# Patient Record
Sex: FEMALE | Race: WHITE | NOT HISPANIC OR LATINO | Employment: FULL TIME | URBAN - METROPOLITAN AREA
[De-identification: names, ages, dates, MRNs, and addresses within clinical notes are randomized per-mention and may not be internally consistent; named-entity substitution may affect disease eponyms.]

---

## 2017-01-13 ENCOUNTER — CONVERSION ENCOUNTER (OUTPATIENT)
Dept: FAMILY MEDICINE CLINIC | Facility: CLINIC | Age: 52
End: 2017-01-13

## 2017-01-13 LAB
ALBUMIN SERPL-MCNC: 4.6 G/DL (ref 3.6–5.1)
ALBUMIN/GLOB SERPL: NORMAL {RATIO} (ref 1–2.5)
ALP SERPL-CCNC: 47 UNITS/L (ref 33–130)
ALT SERPL-CCNC: 13 UNITS/L (ref 6–29)
AST SERPL-CCNC: 13 UNITS/L (ref 10–35)
BILIRUB SERPL-MCNC: 0.6 MG/DL (ref 0.2–1.2)
BUN SERPL-MCNC: 17 MG/DL (ref 7–25)
BUN/CREAT SERPL: NORMAL (ref 6–22)
CALCIUM SERPL-MCNC: 9.9 MG/DL (ref 8.6–10.4)
CHLORIDE SERPL-SCNC: 101 MMOL/L (ref 98–110)
CHOLEST SERPL-MCNC: 197 MG/DL (ref 125–200)
CHOLEST/HDLC SERPL: NORMAL {RATIO}
CO2 CONTENT VENOUS: 27 MMOL/L (ref 20–31)
CONV TOTAL PROTEIN: 7.9 G/DL (ref 6.1–8.1)
CREAT UR-MCNC: 1.02 MG/DL (ref 0.5–1.05)
GLOBULIN UR ELPH-MCNC: NORMAL G/DL (ref 1.9–3.7)
GLUCOSE SERPL-MCNC: 97 MG/DL (ref 65–99)
HDLC SERPL-MCNC: 61 MG/DL
LDLC SERPL CALC-MCNC: NORMAL MG/DL
POTASSIUM SERPL-SCNC: 4.1 MMOL/L (ref 3.5–5.3)
SODIUM SERPL-SCNC: 140 MMOL/L (ref 135–146)
TRIGL SERPL-MCNC: 68 MG/DL
TSH SERPL-ACNC: 2.04 MICROINTL UNITS/ML

## 2017-01-18 ENCOUNTER — APPOINTMENT (OUTPATIENT)
Dept: WOMENS IMAGING | Facility: HOSPITAL | Age: 52
End: 2017-01-18

## 2017-01-18 PROCEDURE — 19081 BX BREAST 1ST LESION STRTCTC: CPT | Performed by: RADIOLOGY

## 2017-02-02 ENCOUNTER — OFFICE VISIT (OUTPATIENT)
Dept: MAMMOGRAPHY | Facility: CLINIC | Age: 52
End: 2017-02-02

## 2017-02-02 VITALS
OXYGEN SATURATION: 97 % | TEMPERATURE: 97.5 F | DIASTOLIC BLOOD PRESSURE: 75 MMHG | BODY MASS INDEX: 36.48 KG/M2 | HEART RATE: 97 BPM | HEIGHT: 66 IN | SYSTOLIC BLOOD PRESSURE: 125 MMHG | WEIGHT: 227 LBS

## 2017-02-02 DIAGNOSIS — D05.11 DUCTAL CARCINOMA IN SITU (DCIS) OF RIGHT BREAST: Primary | ICD-10-CM

## 2017-02-02 PROCEDURE — 99205 OFFICE O/P NEW HI 60 MIN: CPT | Performed by: SURGERY

## 2017-02-02 RX ORDER — CALCIUM CARBONATE 200(500)MG
1 TABLET,CHEWABLE ORAL DAILY
Status: ON HOLD | COMMUNITY
End: 2017-03-01

## 2017-02-02 RX ORDER — DIAZEPAM 2 MG/1
10 TABLET ORAL ONCE
Status: CANCELLED | OUTPATIENT
Start: 2017-02-02 | End: 2017-02-02

## 2017-02-02 RX ORDER — ASPIRIN 81 MG/1
81 TABLET ORAL DAILY
COMMUNITY

## 2017-02-02 RX ORDER — CEFAZOLIN SODIUM 2 G/100ML
2 INJECTION, SOLUTION INTRAVENOUS ONCE
Status: CANCELLED | OUTPATIENT
Start: 2017-02-02 | End: 2017-02-02

## 2017-02-02 RX ORDER — CHLORAL HYDRATE 500 MG
1200 CAPSULE ORAL 2 TIMES DAILY
COMMUNITY
End: 2021-02-16

## 2017-02-02 NOTE — PROGRESS NOTES
Chief Complaint: Connie Green is a 51 y.o.. female here today for Breast Cancer (Right Breast)        History of Present Illness:  Patient presents with newly diagnosed DCIS.  6 months ago she had mammograms which described some microcalcifications in the right breast and a 6 month follow-up was recommended.  These calcifications increased in number on repeat studies and a biopsy was recommended.  The pathology report revealed DCIS which was low to intermediate grade.  It was also ER positive and [place] positive at 95%.  The Ki-67 was low at 8%.  The patient has not had any prior breast biopsies and denies any nipple discharge or trauma to the breast.  I have personally reviewed her imaging studies and agree with the findings.      Review of Systems:  Review of Systems   All other systems reviewed and are negative.       Past Medical and Surgical History:  Breast Biopsy History:  Patient has had the following breast biopsies:Jan 2017 right breast- malignant  Breast Cancer HIstory:  Patient does not have a past medical history of breast cancer.  Breast Operations, and year:  None    History   Smoking Status   • Never Smoker   Smokeless Tobacco   • Not on file     Obstetric History:  Patient is premenopausal, first day of last period: 12/25/2016   Number of pregnancies:4  Number of live births: 3  Number of abortions or miscarriages: 1 stillborn full term baby girl  Age of delivery of first child: 17  Patient breast fed, for the following lenth of time:18 months  Length of time taking birth control pills:15 years  Patient has never taken hormone replacement    Past Surgical History   Procedure Laterality Date   • Laparoscopic tubal ligation     • Endometrial ablation  2007       Past Medical History   Diagnosis Date   • Prothrombin gene mutation        Prior Hospitalizations, other than for surgery or childbirth, and year:  none    Social History:  Patient is .  Patient has 1 daughters. and Patient has 2  sons.    Family History:  Family History   Problem Relation Age of Onset   • Clotting disorder Father    • Heart failure Father    • Heart attack Father    • Alcohol abuse Brother    • Prostate cancer Brother 58   • Heart attack Paternal Grandmother    • Heart failure Paternal Grandmother        Vital Signs:  Vitals:    02/02/17 1049   BP: 125/75   Pulse: 97   Temp: 97.5 °F (36.4 °C)   SpO2: 97%       Medications:    Current Outpatient Prescriptions:   •  Prenatal Vit-Min-FA-Fish Oil ([employer] PRENATAL GUMMY PO), Take 2 tablets by mouth daily., Disp: , Rfl:      Physical Examination:  General Appearance:   Patient is in no distress.  She is well kept and has an obese build.   Psychiatric:  Patient with appropriate mood and affect. Alert and oriented to self, time, and place.    Breast, RIGHT:  medium sized, symmetric with the contralateral side.  Breast skin is without erythema, edema, rashes.  There are no visible abnormalities upon inspection during the arm-raising maneuver or with hands on hips in the sitting position. There is no nipple retraction, discharge or nipple/areolar skin changes.There is a small 1 cm lump in the 1:30 position of the right breast in the area of the biopsy.  There is no bruising detected.    Breast, LEFT:  medium sized, symmetric with the contralateral side.  Breast skin is without erythema, edema, rashes.  There are no visible abnormalities upon inspection during the arm-raising maneuver or with hands on hips in the sitting position. There is no nipple retraction, discharge or nipple/areolar skin changes.There are no masses palpable in the sitting or supine positions.    Lymphatic:  There is no axillary, cervical, infraclavicular, or supraclavicular adenopathy bilaterally.  Eyes:  Pupils are round and reactive to light.  Cardiovascular:  Heart rate and rhythm are regular.  Respiratory:  Lungs are clear bilaterally with no crackles or wheezes in any lung field.  Gastrointestinal:  Abdomen is  soft, nondistended, and nontender.  There is no evidence of hepatosplenomegaly.  There are  scars from previous tubal ligation.    Musculoskeletal:  Good strength in all 4 extremities.   There is good range of motion in both shoulders.    Skin:  No new skin lesions or rashes on the skin excluding the breast (see breast exam above).    Assessment:  Diagnoses and all orders for this visit:    Ductal carcinoma in situ (DCIS) of right breast    Other orders  -     aspirin 81 MG EC tablet; Take 81 mg by mouth Daily.  -     Omega-3 Fatty Acids (FISH OIL) 1000 MG capsule capsule; Take  by mouth Daily With Breakfast.  -     calcium carbonate (TUMS) 500 MG chewable tablet; Chew 1 tablet Daily.  -     Multiple Vitamins-Minerals (MULTI COMPLETE PO); Take  by mouth.  -     Melatonin-Pyridoxine (MELATIN PO); Take  by mouth.      Plan:  She was accompanied today by her .  The office visit lasted 45 minutes.    We began the conversation discussing her pathology report.  We talked about the origin of most of breast cancers from either the ducts or the lobules.  The difference between invasive and in situ disease was explained and the visual was drawn for her.  When invasion has occurred, the lymph nodes need to be evaluated.  When there is in situ disease the lymph nodes should be considered if the area of concern is widespread or it is high-grade.  We also discussed the significance of hormone receptors.  They often can give us some idea how the breast cancer will behave and potentially lead us to offer neoadjuvant chemotherapy.    Next we discussed the surgical options which include breast conserving therapy versus a mastectomy.  With breast conserving therapy we are talking about a lumpectomy with margins, lymph node evaluation, and radiation treatment.  The radiation treatment is generally given to the entire breast for 6 weeks.  The side effects consist of local skin change and potential injury to nearby structures such  as the lung or the heart.  A mastectomy would involve removing the breast tissues with preservation of the pectoral muscles and evaluation of the lymph nodes if indicated.  The potential for reconstruction by either an implant or autologous tissue was discussed.  This could be performed on a delayed basis or immediately depending on a multitude of factors.  The survival rates for these 2 procedures are equivalent but there are incidences where one may be favored over the other.  There are times when a lumpectomy is not possible due to the large tumor to breast ratio or the location of the tumor.  Previous radiation to the chest wall or collagen disorders such as scleroderma would make radiation treatment and possible and therefore exclude breast conserving therapy as an option.    The patient prefers breast conserving surgery and I think that is an excellent option for her.  I do not think she would benefit from an MRI or sentinel lymph node biopsy and I have explained that to her.  She does understand the needle localization process and her recovery time has been explained to her.  She understands the risks which include but are not limited to infection, bleeding, anesthesia, and the need to return to surgery for a positive margin.  She wishes to proceed.      CPT coding:    Next Appointment:  No Follow-up on file.

## 2017-02-08 ENCOUNTER — TELEPHONE (OUTPATIENT)
Dept: MAMMOGRAPHY | Facility: CLINIC | Age: 52
End: 2017-02-08

## 2017-02-08 NOTE — TELEPHONE ENCOUNTER
Left voicemail for patient on surgery details    PAT appointment is 2/21/17 @ 1230pm  Arrival time for surgery is  6 am 3/1/17 Main OR

## 2017-02-21 ENCOUNTER — HOSPITAL ENCOUNTER (OUTPATIENT)
Dept: GENERAL RADIOLOGY | Facility: HOSPITAL | Age: 52
Discharge: HOME | End: 2017-02-21
Admitting: SURGERY

## 2017-02-21 ENCOUNTER — APPOINTMENT (OUTPATIENT)
Dept: PREADMISSION TESTING | Facility: HOSPITAL | Age: 52
End: 2017-02-21

## 2017-02-21 VITALS
DIASTOLIC BLOOD PRESSURE: 86 MMHG | BODY MASS INDEX: 36.8 KG/M2 | SYSTOLIC BLOOD PRESSURE: 124 MMHG | HEIGHT: 66 IN | RESPIRATION RATE: 16 BRPM | WEIGHT: 229 LBS | TEMPERATURE: 97.9 F | HEART RATE: 83 BPM | OXYGEN SATURATION: 100 %

## 2017-02-21 DIAGNOSIS — D05.11 DUCTAL CARCINOMA IN SITU (DCIS) OF RIGHT BREAST: ICD-10-CM

## 2017-02-21 LAB
ALBUMIN SERPL-MCNC: 4.3 G/DL (ref 3.5–5.2)
ALBUMIN/GLOB SERPL: 1.3 G/DL
ALP SERPL-CCNC: 43 U/L (ref 39–117)
ALT SERPL W P-5'-P-CCNC: 15 U/L (ref 1–33)
ANION GAP SERPL CALCULATED.3IONS-SCNC: 19.1 MMOL/L
AST SERPL-CCNC: 18 U/L (ref 1–32)
BILIRUB SERPL-MCNC: 0.5 MG/DL (ref 0.1–1.2)
BUN BLD-MCNC: 16 MG/DL (ref 6–20)
BUN/CREAT SERPL: 17.8 (ref 7–25)
CALCIUM SPEC-SCNC: 9.2 MG/DL (ref 8.6–10.5)
CHLORIDE SERPL-SCNC: 102 MMOL/L (ref 98–107)
CO2 SERPL-SCNC: 19.9 MMOL/L (ref 22–29)
CREAT BLD-MCNC: 0.9 MG/DL (ref 0.57–1)
DEPRECATED RDW RBC AUTO: 44.2 FL (ref 37–54)
ERYTHROCYTE [DISTWIDTH] IN BLOOD BY AUTOMATED COUNT: 13.5 % (ref 11.7–13)
GFR SERPL CREATININE-BSD FRML MDRD: 66 ML/MIN/1.73
GLOBULIN UR ELPH-MCNC: 3.4 GM/DL
GLUCOSE BLD-MCNC: 97 MG/DL (ref 65–99)
HCT VFR BLD AUTO: 42.5 % (ref 35.6–45.5)
HGB BLD-MCNC: 14.3 G/DL (ref 11.9–15.5)
MCH RBC QN AUTO: 30 PG (ref 26.9–32)
MCHC RBC AUTO-ENTMCNC: 33.6 G/DL (ref 32.4–36.3)
MCV RBC AUTO: 89.3 FL (ref 80.5–98.2)
PLATELET # BLD AUTO: 256 10*3/MM3 (ref 140–500)
PMV BLD AUTO: 10.5 FL (ref 6–12)
POTASSIUM BLD-SCNC: 4 MMOL/L (ref 3.5–5.2)
PROT SERPL-MCNC: 7.7 G/DL (ref 6–8.5)
RBC # BLD AUTO: 4.76 10*6/MM3 (ref 3.9–5.2)
SODIUM BLD-SCNC: 141 MMOL/L (ref 136–145)
WBC NRBC COR # BLD: 6.68 10*3/MM3 (ref 4.5–10.7)

## 2017-02-21 PROCEDURE — 85027 COMPLETE CBC AUTOMATED: CPT | Performed by: SURGERY

## 2017-02-21 PROCEDURE — 36415 COLL VENOUS BLD VENIPUNCTURE: CPT

## 2017-02-21 PROCEDURE — 93010 ELECTROCARDIOGRAM REPORT: CPT | Performed by: INTERNAL MEDICINE

## 2017-02-21 PROCEDURE — 93005 ELECTROCARDIOGRAM TRACING: CPT

## 2017-02-21 PROCEDURE — 80053 COMPREHEN METABOLIC PANEL: CPT | Performed by: SURGERY

## 2017-02-21 PROCEDURE — 71020 HC CHEST PA AND LATERAL: CPT

## 2017-02-21 NOTE — DISCHARGE INSTRUCTIONS
Take the following medications the morning of surgery with a small sip of water.  NONE    ARRIVE AT 0600.        General Instructions:  • Do not eat or drink after midnight: includes water, mints, or gum. You may brush your teeth.  • Do not smoke, chew tobacco, or drink alcohol.  • Bring medications in original bottles, any inhalers and if applicable your C-PAP/ BI-PAP machine.  • Bring any papers given to you in the doctor’s office.  • Wear clean comfortable clothes and socks.  • Do not wear contact lenses or make-up.  Bring a case for your glasses if applicable.   • Bring crutches or walker if applicable.  • Leave all other valuables and jewelry at home.    If you were given a blood bank ID arm band remember to bring it with you the day of surgery.    Preventing a Surgical Site Infection:  Shower on the morning of surgery using a fresh bar of anti-bacterial soap (such as Dial) and clean washcloth.  Dry with a clean towel and dress in clean clothing.  For 2 to 3 days before surgery, avoid shaving with a razor near where you will have surgery because the razor can irritate skin and make it easier to develop an infection  Ask your surgeon if you will be receiving antibiotics prior to surgery  Make sure you, your family, and all healthcare providers clean their hands with soap and water or an alcohol based hand  before caring for you or your wound  If at all possible, quit smoking as many days before surgery as you can.    Day of surgery:  Upon arrival, a Pre-op nurse and Anesthesiologist will review your health history, obtain vital signs, and answer questions you may have.  The only belongings needed at this time will be your home medications and if applicable your C-PAP/BI-PAP machine.  If you are staying overnight your family can leave the rest of your belongings in the car and bring them to your room later.  A Pre-op nurse will start an IV and you may receive medication in preparation for surgery,  including something to help you relax.  Your family will be able to see you in the Pre-op area.  While you are in surgery your family should notify the waiting room  if they leave the waiting room area and provide a contact phone number.    Please be aware that surgery does come with discomfort.  We want to make every effort to control your discomfort so please discuss any uncontrolled symptoms with your nurse.   Your doctor will most likely have prescribed pain medications.      If you are going home after surgery you will receive individualized written care instructions before being discharged.  A responsible adult must drive you to and from the hospital on the day of your surgery and stay with you for 24 hours.    If you are staying overnight following surgery, you will be transported to your hospital room following the recovery period.  [facility] has all private rooms.    If you have any questions please call Pre-Admission Testing at [phone].  Deductibles and co-payments are collected on the day of service. Please be prepared to pay the required co-pay, deductible or deposit on the day of service as defined by your plan.

## 2017-03-01 ENCOUNTER — HOSPITAL ENCOUNTER (OUTPATIENT)
Dept: MAMMOGRAPHY | Facility: HOSPITAL | Age: 52
Discharge: HOME | End: 2017-03-01
Attending: SURGERY

## 2017-03-01 ENCOUNTER — APPOINTMENT (OUTPATIENT)
Dept: GENERAL RADIOLOGY | Facility: HOSPITAL | Age: 52
End: 2017-03-01

## 2017-03-01 ENCOUNTER — ANESTHESIA EVENT (OUTPATIENT)
Dept: PERIOP | Facility: HOSPITAL | Age: 52
End: 2017-03-01

## 2017-03-01 ENCOUNTER — ANESTHESIA (OUTPATIENT)
Dept: PERIOP | Facility: HOSPITAL | Age: 52
End: 2017-03-01

## 2017-03-01 ENCOUNTER — HOSPITAL ENCOUNTER (OUTPATIENT)
Facility: HOSPITAL | Age: 52
Setting detail: HOSPITAL OUTPATIENT SURGERY
Discharge: HOME | End: 2017-03-01
Attending: SURGERY | Admitting: SURGERY

## 2017-03-01 VITALS
WEIGHT: 227.13 LBS | BODY MASS INDEX: 36.5 KG/M2 | HEIGHT: 66 IN | SYSTOLIC BLOOD PRESSURE: 138 MMHG | TEMPERATURE: 97.8 F | DIASTOLIC BLOOD PRESSURE: 84 MMHG | HEART RATE: 67 BPM | OXYGEN SATURATION: 97 % | RESPIRATION RATE: 16 BRPM

## 2017-03-01 DIAGNOSIS — D05.11 DUCTAL CARCINOMA IN SITU (DCIS) OF RIGHT BREAST: ICD-10-CM

## 2017-03-01 LAB
B-HCG UR QL: NEGATIVE
INTERNAL NEGATIVE CONTROL: NEGATIVE
INTERNAL POSITIVE CONTROL: POSITIVE
Lab: NORMAL

## 2017-03-01 PROCEDURE — 25010000002 ONDANSETRON PER 1 MG: Performed by: NURSE ANESTHETIST, CERTIFIED REGISTERED

## 2017-03-01 PROCEDURE — 25010000002 MIDAZOLAM PER 1 MG: Performed by: ANESTHESIOLOGY

## 2017-03-01 PROCEDURE — 25010000002 MIDAZOLAM PER 1 MG: Performed by: NURSE ANESTHETIST, CERTIFIED REGISTERED

## 2017-03-01 PROCEDURE — 25010000003 CEFAZOLIN IN DEXTROSE 2-4 GM/100ML-% SOLUTION: Performed by: SURGERY

## 2017-03-01 PROCEDURE — 88307 TISSUE EXAM BY PATHOLOGIST: CPT | Performed by: SURGERY

## 2017-03-01 PROCEDURE — 19301 PARTIAL MASTECTOMY: CPT | Performed by: SURGERY

## 2017-03-01 PROCEDURE — 25010000002 DEXAMETHASONE PER 1 MG: Performed by: NURSE ANESTHETIST, CERTIFIED REGISTERED

## 2017-03-01 PROCEDURE — 25010000002 PROPOFOL 10 MG/ML EMULSION: Performed by: NURSE ANESTHETIST, CERTIFIED REGISTERED

## 2017-03-01 PROCEDURE — 25010000002 FENTANYL CITRATE (PF) 100 MCG/2ML SOLUTION: Performed by: NURSE ANESTHETIST, CERTIFIED REGISTERED

## 2017-03-01 RX ORDER — BUPIVACAINE HYDROCHLORIDE 2.5 MG/ML
INJECTION, SOLUTION INFILTRATION; PERINEURAL AS NEEDED
Status: DISCONTINUED | OUTPATIENT
Start: 2017-03-01 | End: 2017-03-01 | Stop reason: HOSPADM

## 2017-03-01 RX ORDER — MIDAZOLAM HYDROCHLORIDE 1 MG/ML
2 INJECTION INTRAMUSCULAR; INTRAVENOUS
Status: DISCONTINUED | OUTPATIENT
Start: 2017-03-01 | End: 2017-03-01 | Stop reason: HOSPADM

## 2017-03-01 RX ORDER — HYDROMORPHONE HYDROCHLORIDE 1 MG/ML
0.5 INJECTION, SOLUTION INTRAMUSCULAR; INTRAVENOUS; SUBCUTANEOUS
Status: DISCONTINUED | OUTPATIENT
Start: 2017-03-01 | End: 2017-03-01 | Stop reason: HOSPADM

## 2017-03-01 RX ORDER — PROMETHAZINE HYDROCHLORIDE 25 MG/ML
12.5 INJECTION, SOLUTION INTRAMUSCULAR; INTRAVENOUS ONCE AS NEEDED
Status: DISCONTINUED | OUTPATIENT
Start: 2017-03-01 | End: 2017-03-01 | Stop reason: HOSPADM

## 2017-03-01 RX ORDER — ONDANSETRON 2 MG/ML
INJECTION INTRAMUSCULAR; INTRAVENOUS AS NEEDED
Status: DISCONTINUED | OUTPATIENT
Start: 2017-03-01 | End: 2017-03-01 | Stop reason: SURG

## 2017-03-01 RX ORDER — PROMETHAZINE HYDROCHLORIDE 25 MG/1
25 SUPPOSITORY RECTAL ONCE AS NEEDED
Status: DISCONTINUED | OUTPATIENT
Start: 2017-03-01 | End: 2017-03-01 | Stop reason: HOSPADM

## 2017-03-01 RX ORDER — PROMETHAZINE HYDROCHLORIDE 25 MG/1
12.5 TABLET ORAL ONCE AS NEEDED
Status: DISCONTINUED | OUTPATIENT
Start: 2017-03-01 | End: 2017-03-01 | Stop reason: HOSPADM

## 2017-03-01 RX ORDER — FAMOTIDINE 10 MG/ML
20 INJECTION, SOLUTION INTRAVENOUS ONCE
Status: COMPLETED | OUTPATIENT
Start: 2017-03-01 | End: 2017-03-01

## 2017-03-01 RX ORDER — CEFAZOLIN SODIUM 2 G/100ML
2 INJECTION, SOLUTION INTRAVENOUS ONCE
Status: COMPLETED | OUTPATIENT
Start: 2017-03-01 | End: 2017-03-01

## 2017-03-01 RX ORDER — FLUMAZENIL 0.1 MG/ML
0.2 INJECTION INTRAVENOUS AS NEEDED
Status: DISCONTINUED | OUTPATIENT
Start: 2017-03-01 | End: 2017-03-01 | Stop reason: HOSPADM

## 2017-03-01 RX ORDER — LIDOCAINE WITH 8.4% SOD BICARB 0.9%(10ML)
3 SYRINGE (ML) INJECTION ONCE
Status: COMPLETED | OUTPATIENT
Start: 2017-03-01 | End: 2017-03-01

## 2017-03-01 RX ORDER — FENTANYL CITRATE 50 UG/ML
50 INJECTION, SOLUTION INTRAMUSCULAR; INTRAVENOUS
Status: DISCONTINUED | OUTPATIENT
Start: 2017-03-01 | End: 2017-03-01 | Stop reason: HOSPADM

## 2017-03-01 RX ORDER — NALOXONE HCL 0.4 MG/ML
0.2 VIAL (ML) INJECTION AS NEEDED
Status: DISCONTINUED | OUTPATIENT
Start: 2017-03-01 | End: 2017-03-01 | Stop reason: HOSPADM

## 2017-03-01 RX ORDER — DIAZEPAM 2 MG/1
10 TABLET ORAL ONCE
Status: COMPLETED | OUTPATIENT
Start: 2017-03-01 | End: 2017-03-01

## 2017-03-01 RX ORDER — MIDAZOLAM HYDROCHLORIDE 1 MG/ML
1 INJECTION INTRAMUSCULAR; INTRAVENOUS
Status: DISCONTINUED | OUTPATIENT
Start: 2017-03-01 | End: 2017-03-01 | Stop reason: HOSPADM

## 2017-03-01 RX ORDER — MIDAZOLAM HYDROCHLORIDE 1 MG/ML
INJECTION INTRAMUSCULAR; INTRAVENOUS AS NEEDED
Status: DISCONTINUED | OUTPATIENT
Start: 2017-03-01 | End: 2017-03-01 | Stop reason: SURG

## 2017-03-01 RX ORDER — PROPOFOL 10 MG/ML
VIAL (ML) INTRAVENOUS AS NEEDED
Status: DISCONTINUED | OUTPATIENT
Start: 2017-03-01 | End: 2017-03-01 | Stop reason: SURG

## 2017-03-01 RX ORDER — HYDROCODONE BITARTRATE AND ACETAMINOPHEN 7.5; 325 MG/1; MG/1
1 TABLET ORAL ONCE AS NEEDED
Status: COMPLETED | OUTPATIENT
Start: 2017-03-01 | End: 2017-03-01

## 2017-03-01 RX ORDER — ONDANSETRON 2 MG/ML
4 INJECTION INTRAMUSCULAR; INTRAVENOUS ONCE AS NEEDED
Status: DISCONTINUED | OUTPATIENT
Start: 2017-03-01 | End: 2017-03-01 | Stop reason: HOSPADM

## 2017-03-01 RX ORDER — DEXAMETHASONE SODIUM PHOSPHATE 10 MG/ML
INJECTION INTRAMUSCULAR; INTRAVENOUS AS NEEDED
Status: DISCONTINUED | OUTPATIENT
Start: 2017-03-01 | End: 2017-03-01 | Stop reason: SURG

## 2017-03-01 RX ORDER — FENTANYL CITRATE 50 UG/ML
INJECTION, SOLUTION INTRAMUSCULAR; INTRAVENOUS AS NEEDED
Status: DISCONTINUED | OUTPATIENT
Start: 2017-03-01 | End: 2017-03-01 | Stop reason: SURG

## 2017-03-01 RX ORDER — OXYCODONE AND ACETAMINOPHEN 7.5; 325 MG/1; MG/1
1 TABLET ORAL ONCE AS NEEDED
Status: DISCONTINUED | OUTPATIENT
Start: 2017-03-01 | End: 2017-03-01 | Stop reason: HOSPADM

## 2017-03-01 RX ORDER — SODIUM CHLORIDE, SODIUM LACTATE, POTASSIUM CHLORIDE, CALCIUM CHLORIDE 600; 310; 30; 20 MG/100ML; MG/100ML; MG/100ML; MG/100ML
9 INJECTION, SOLUTION INTRAVENOUS CONTINUOUS
Status: DISCONTINUED | OUTPATIENT
Start: 2017-03-01 | End: 2017-03-01 | Stop reason: HOSPADM

## 2017-03-01 RX ORDER — DIPHENHYDRAMINE HYDROCHLORIDE 50 MG/ML
12.5 INJECTION INTRAMUSCULAR; INTRAVENOUS
Status: DISCONTINUED | OUTPATIENT
Start: 2017-03-01 | End: 2017-03-01 | Stop reason: HOSPADM

## 2017-03-01 RX ORDER — HYDROMORPHONE HYDROCHLORIDE 1 MG/ML
0.25 INJECTION, SOLUTION INTRAMUSCULAR; INTRAVENOUS; SUBCUTANEOUS
Status: DISCONTINUED | OUTPATIENT
Start: 2017-03-01 | End: 2017-03-01 | Stop reason: HOSPADM

## 2017-03-01 RX ORDER — SODIUM CHLORIDE 0.9 % (FLUSH) 0.9 %
1-10 SYRINGE (ML) INJECTION AS NEEDED
Status: DISCONTINUED | OUTPATIENT
Start: 2017-03-01 | End: 2017-03-01 | Stop reason: HOSPADM

## 2017-03-01 RX ORDER — PROMETHAZINE HYDROCHLORIDE 25 MG/1
25 TABLET ORAL ONCE AS NEEDED
Status: DISCONTINUED | OUTPATIENT
Start: 2017-03-01 | End: 2017-03-01 | Stop reason: HOSPADM

## 2017-03-01 RX ORDER — LIDOCAINE HYDROCHLORIDE 20 MG/ML
INJECTION, SOLUTION INFILTRATION; PERINEURAL AS NEEDED
Status: DISCONTINUED | OUTPATIENT
Start: 2017-03-01 | End: 2017-03-01 | Stop reason: SURG

## 2017-03-01 RX ORDER — HYDRALAZINE HYDROCHLORIDE 20 MG/ML
5 INJECTION INTRAMUSCULAR; INTRAVENOUS
Status: DISCONTINUED | OUTPATIENT
Start: 2017-03-01 | End: 2017-03-01 | Stop reason: HOSPADM

## 2017-03-01 RX ORDER — LABETALOL HYDROCHLORIDE 5 MG/ML
5 INJECTION, SOLUTION INTRAVENOUS
Status: DISCONTINUED | OUTPATIENT
Start: 2017-03-01 | End: 2017-03-01 | Stop reason: HOSPADM

## 2017-03-01 RX ADMIN — FENTANYL CITRATE 25 MCG: 50 INJECTION INTRAMUSCULAR; INTRAVENOUS at 09:50

## 2017-03-01 RX ADMIN — FAMOTIDINE 20 MG: 10 INJECTION, SOLUTION INTRAVENOUS at 08:25

## 2017-03-01 RX ADMIN — FENTANYL CITRATE 50 MCG: 50 INJECTION INTRAMUSCULAR; INTRAVENOUS at 09:31

## 2017-03-01 RX ADMIN — MIDAZOLAM 2 MG: 1 INJECTION INTRAMUSCULAR; INTRAVENOUS at 08:25

## 2017-03-01 RX ADMIN — FENTANYL CITRATE 25 MCG: 50 INJECTION INTRAMUSCULAR; INTRAVENOUS at 09:44

## 2017-03-01 RX ADMIN — Medication 3 ML: at 07:53

## 2017-03-01 RX ADMIN — SODIUM CHLORIDE, POTASSIUM CHLORIDE, SODIUM LACTATE AND CALCIUM CHLORIDE 9 ML/HR: 600; 310; 30; 20 INJECTION, SOLUTION INTRAVENOUS at 08:25

## 2017-03-01 RX ADMIN — CEFAZOLIN SODIUM 2 G: 2 INJECTION, SOLUTION INTRAVENOUS at 09:26

## 2017-03-01 RX ADMIN — FENTANYL CITRATE 25 MCG: 50 INJECTION INTRAMUSCULAR; INTRAVENOUS at 10:01

## 2017-03-01 RX ADMIN — DIAZEPAM 10 MG: 5 TABLET ORAL at 07:12

## 2017-03-01 RX ADMIN — PROPOFOL 220 MG: 10 INJECTION, EMULSION INTRAVENOUS at 09:31

## 2017-03-01 RX ADMIN — MIDAZOLAM HYDROCHLORIDE 2 MG: 1 INJECTION, SOLUTION INTRAMUSCULAR; INTRAVENOUS at 09:27

## 2017-03-01 RX ADMIN — ONDANSETRON 4 MG: 2 INJECTION INTRAMUSCULAR; INTRAVENOUS at 09:45

## 2017-03-01 RX ADMIN — DEXAMETHASONE SODIUM PHOSPHATE 8 MG: 10 INJECTION INTRAMUSCULAR; INTRAVENOUS at 09:45

## 2017-03-01 RX ADMIN — LIDOCAINE HYDROCHLORIDE 50 MG: 20 INJECTION, SOLUTION INFILTRATION; PERINEURAL at 09:31

## 2017-03-01 RX ADMIN — SODIUM CHLORIDE, POTASSIUM CHLORIDE, SODIUM LACTATE AND CALCIUM CHLORIDE: 600; 310; 30; 20 INJECTION, SOLUTION INTRAVENOUS at 09:26

## 2017-03-01 RX ADMIN — HYDROCODONE BITARTRATE AND ACETAMINOPHEN 1 TABLET: 7.5; 325 TABLET ORAL at 11:08

## 2017-03-01 NOTE — PLAN OF CARE
Problem: Patient Care Overview (Adult)  Goal: Plan of Care Review  Outcome: Outcome(s) achieved Date Met:  03/01/17 03/01/17 1147   Coping/Psychosocial Response Interventions   Plan Of Care Reviewed With patient;spouse   Patient Care Overview   Progress improving   Outcome Evaluation   Outcome Summary/Follow up Plan ready for d/c home       Goal: Adult Individualization and Mutuality  Outcome: Outcome(s) achieved Date Met:  03/01/17  Goal: Discharge Needs Assessment  Outcome: Outcome(s) achieved Date Met:  03/01/17    Problem: Perioperative Period (Adult)  Goal: Signs and Symptoms of Listed Potential Problems Will be Absent or Manageable (Perioperative Period)  Outcome: Outcome(s) achieved Date Met:  03/01/17

## 2017-03-01 NOTE — ANESTHESIA PREPROCEDURE EVALUATION
Anesthesia Evaluation     Patient summary reviewed and Nursing notes reviewed   no history of anesthetic complications:  NPO Status: > 8 hours   Airway   Mallampati: II  TM distance: >3 FB  Neck ROM: full  no difficulty expected  Dental - normal exam     Pulmonary - negative pulmonary ROS and normal exam   Cardiovascular - negative cardio ROS and normal exam  Exercise tolerance: good (4-7 METS)    ECG reviewed  Rhythm: regular  Rate: normal        Neuro/Psych- negative ROS  GI/Hepatic/Renal/Endo    (+) obesity,      Musculoskeletal (-) negative ROS    Abdominal  - normal exam   Substance History - negative use     OB/GYN          Other - negative ROS                                   Anesthesia Plan    ASA 2     general     Anesthetic plan and risks discussed with patient.    Plan discussed with CRNA and attending.

## 2017-03-01 NOTE — ANESTHESIA PROCEDURE NOTES
Airway  Urgency: elective    Date/Time: 3/1/2017 9:31 AM  End Time:3/1/2017 9:33 AM  Airway not difficult    General Information and Staff    Patient location during procedure: OR  Anesthesiologist: NORM ISLAS  CRNA: GERRY DASILVA    Indications and Patient Condition  Indications for airway management: airway protection    Preoxygenated: yes  MILS maintained throughout  Mask difficulty assessment: 1 - vent by mask    Final Airway Details  Final airway type: supraglottic airway      Successful airway: classic  Size 4    Number of attempts at approach: 1    Additional Comments  PreO2 X 5 mins. SIVI. LMA inserted without difficulty. ETCO2 +, BBS=. Spontaneous respirations

## 2017-03-01 NOTE — H&P (VIEW-ONLY)
Chief Complaint: Connie Green is a 51 y.o.. female here today for Breast Cancer (Right Breast)        History of Present Illness:  Patient presents with newly diagnosed DCIS.  6 months ago she had mammograms which described some microcalcifications in the right breast and a 6 month follow-up was recommended.  These calcifications increased in number on repeat studies and a biopsy was recommended.  The pathology report revealed DCIS which was low to intermediate grade.  It was also ER positive and [place] positive at 95%.  The Ki-67 was low at 8%.  The patient has not had any prior breast biopsies and denies any nipple discharge or trauma to the breast.  I have personally reviewed her imaging studies and agree with the findings.      Review of Systems:  Review of Systems   All other systems reviewed and are negative.       Past Medical and Surgical History:  Breast Biopsy History:  Patient has had the following breast biopsies:Jan 2017 right breast- malignant  Breast Cancer HIstory:  Patient does not have a past medical history of breast cancer.  Breast Operations, and year:  None    History   Smoking Status   • Never Smoker   Smokeless Tobacco   • Not on file     Obstetric History:  Patient is premenopausal, first day of last period: 12/25/2016   Number of pregnancies:4  Number of live births: 3  Number of abortions or miscarriages: 1 stillborn full term baby girl  Age of delivery of first child: 17  Patient breast fed, for the following lenth of time:18 months  Length of time taking birth control pills:15 years  Patient has never taken hormone replacement    Past Surgical History   Procedure Laterality Date   • Laparoscopic tubal ligation     • Endometrial ablation  2007       Past Medical History   Diagnosis Date   • Prothrombin gene mutation        Prior Hospitalizations, other than for surgery or childbirth, and year:  none    Social History:  Patient is .  Patient has 1 daughters. and Patient has 2  sons.    Family History:  Family History   Problem Relation Age of Onset   • Clotting disorder Father    • Heart failure Father    • Heart attack Father    • Alcohol abuse Brother    • Prostate cancer Brother 58   • Heart attack Paternal Grandmother    • Heart failure Paternal Grandmother        Vital Signs:  Vitals:    02/02/17 1049   BP: 125/75   Pulse: 97   Temp: 97.5 °F (36.4 °C)   SpO2: 97%       Medications:    Current Outpatient Prescriptions:   •  Prenatal Vit-Min-FA-Fish Oil ([employer] PRENATAL GUMMY PO), Take 2 tablets by mouth daily., Disp: , Rfl:      Physical Examination:  General Appearance:   Patient is in no distress.  She is well kept and has an obese build.   Psychiatric:  Patient with appropriate mood and affect. Alert and oriented to self, time, and place.    Breast, RIGHT:  medium sized, symmetric with the contralateral side.  Breast skin is without erythema, edema, rashes.  There are no visible abnormalities upon inspection during the arm-raising maneuver or with hands on hips in the sitting position. There is no nipple retraction, discharge or nipple/areolar skin changes.There is a small 1 cm lump in the 1:30 position of the right breast in the area of the biopsy.  There is no bruising detected.    Breast, LEFT:  medium sized, symmetric with the contralateral side.  Breast skin is without erythema, edema, rashes.  There are no visible abnormalities upon inspection during the arm-raising maneuver or with hands on hips in the sitting position. There is no nipple retraction, discharge or nipple/areolar skin changes.There are no masses palpable in the sitting or supine positions.    Lymphatic:  There is no axillary, cervical, infraclavicular, or supraclavicular adenopathy bilaterally.  Eyes:  Pupils are round and reactive to light.  Cardiovascular:  Heart rate and rhythm are regular.  Respiratory:  Lungs are clear bilaterally with no crackles or wheezes in any lung field.  Gastrointestinal:  Abdomen is  soft, nondistended, and nontender.  There is no evidence of hepatosplenomegaly.  There are  scars from previous tubal ligation.    Musculoskeletal:  Good strength in all 4 extremities.   There is good range of motion in both shoulders.    Skin:  No new skin lesions or rashes on the skin excluding the breast (see breast exam above).    Assessment:  Diagnoses and all orders for this visit:    Ductal carcinoma in situ (DCIS) of right breast    Other orders  -     aspirin 81 MG EC tablet; Take 81 mg by mouth Daily.  -     Omega-3 Fatty Acids (FISH OIL) 1000 MG capsule capsule; Take  by mouth Daily With Breakfast.  -     calcium carbonate (TUMS) 500 MG chewable tablet; Chew 1 tablet Daily.  -     Multiple Vitamins-Minerals (MULTI COMPLETE PO); Take  by mouth.  -     Melatonin-Pyridoxine (MELATIN PO); Take  by mouth.      Plan:  She was accompanied today by her .  The office visit lasted 45 minutes.    We began the conversation discussing her pathology report.  We talked about the origin of most of breast cancers from either the ducts or the lobules.  The difference between invasive and in situ disease was explained and the visual was drawn for her.  When invasion has occurred, the lymph nodes need to be evaluated.  When there is in situ disease the lymph nodes should be considered if the area of concern is widespread or it is high-grade.  We also discussed the significance of hormone receptors.  They often can give us some idea how the breast cancer will behave and potentially lead us to offer neoadjuvant chemotherapy.    Next we discussed the surgical options which include breast conserving therapy versus a mastectomy.  With breast conserving therapy we are talking about a lumpectomy with margins, lymph node evaluation, and radiation treatment.  The radiation treatment is generally given to the entire breast for 6 weeks.  The side effects consist of local skin change and potential injury to nearby structures such  as the lung or the heart.  A mastectomy would involve removing the breast tissues with preservation of the pectoral muscles and evaluation of the lymph nodes if indicated.  The potential for reconstruction by either an implant or autologous tissue was discussed.  This could be performed on a delayed basis or immediately depending on a multitude of factors.  The survival rates for these 2 procedures are equivalent but there are incidences where one may be favored over the other.  There are times when a lumpectomy is not possible due to the large tumor to breast ratio or the location of the tumor.  Previous radiation to the chest wall or collagen disorders such as scleroderma would make radiation treatment and possible and therefore exclude breast conserving therapy as an option.    The patient prefers breast conserving surgery and I think that is an excellent option for her.  I do not think she would benefit from an MRI or sentinel lymph node biopsy and I have explained that to her.  She does understand the needle localization process and her recovery time has been explained to her.  She understands the risks which include but are not limited to infection, bleeding, anesthesia, and the need to return to surgery for a positive margin.  She wishes to proceed.      CPT coding:    Next Appointment:  No Follow-up on file.

## 2017-03-01 NOTE — OP NOTE
Needle Localization Breast lumpectomy for DCIS Procedure Note    Name: Connie Green  Age: 51 y.o.  Sex: female  :  1965  MRN: [id number]      Pre-operative Diagnosis:rightDCIS    Post-operative Diagnosis: Same    Procedure:right Needle Localization Breast lumpectomy    Surgeon: Neal Sanchez MD.,  FACS    Assistants: none    Anesthesia: General    Indications: This patient recently was diagnosed with right DCIS after presenting with an abnormal mammogram.  After discussing the options, the patient prefers to have breast conserving surgery.  She is here for a lumpectomy.      Procedure Details   The patient was seen in the Holding Room. The risks, benefits, complications, treatment options, and expected outcomes were discussed with the patient. The possibilities of reaction to medication, pulmonary aspiration, bleeding, infection, the need for additional procedures, failure to diagnose a condition, and creating a complication requiring transfusion or operation were discussed with the patient. The patient concurred with the proposed plan, giving informed consent.  The site of surgery properly noted/marked.    The patient underwent preoperative guidewire localization of a mammographic abnormality in the  upper inner aspect of the right breast.       The patient was then taken to Operating Room; identified patient as Connie Green  and the procedure verified as rightNeedle Localization  Breast lumpectomy  A Time Out was held and the above information confirmed.       The breast was prepped and draped in standard fashion. Marcaine  0.50% with epinephrine was used to anesthetize the skin at the site of the guidewire placement.   A curvilinear incision was created on the breast near the localization site.  Dissection was carried down to the localized area which was completely excised.  The dissection was carried down to the chest wall.  A specimen radiograph confirmed inclusion of the preoperatively identified  mammographic lesion/ clip.  Additional shave margins were obtained from areas thought to be close.  This included the anterior superior and lateral margins.. Hemostasis was achieved with cautery.  Closure was performed  with interrupted 3-0 Vicryl sutures and a 4-0 Vicryl subcuticular closure.      Steri-Strips were applied. At the end of the operation, all sponge, instrument, and needle counts were correct.    Findings:  There were no unexpected findings  Estimated Blood Loss:  Minimal           Drains: none          Specimens:   ID Type Source Tests Collected by Time Destination   A : RIGHT BREAST LUMPECTOMY-SINGLE SHORT STITCH MARKS SUPERIOR, LONG STITCH MARKS LATERAL, DOUBLE STITCH MARKS  ANTERIOR Tissue Breast, Right TISSUE EXAM Neal Sanchez MD 3/1/2017 1009    B : RIGHT BREAST LUMPECTOMY--STITCH MARKS NEW MEDIAL MARGIN Tissue Breast, Right TISSUE EXAM Neal Sanchez MD 3/1/2017 1014    C : RIGHT BREAST LUMPECTOMY--STITCH MARKS NEW SUPERIOR MARGIN Tissue Breast, Right TISSUE EXAM Neal Sanchez MD 3/1/2017 1020    D : RIGHT BREAST LUMPECTOMY--STITCH MARKS NEW ANTERIOR MARGIN Tissue Breast, Right TISSUE EXAM Neal Sanchez MD 3/1/2017 1021        Complications:  None; patient tolerated the procedure well.           Condition: stable

## 2017-03-01 NOTE — ANESTHESIA POSTPROCEDURE EVALUATION
Patient: Connie Green    Procedure Summary     Date Anesthesia Start Anesthesia Stop Room / Location    03/01/17 0926 1054  [facility]       Procedure Diagnosis Surgeon Provider    RIGHT BREAST LUMPECTOMY WITH NEEDLE LOCALIZATION (Right Breast) Ductal carcinoma in situ (DCIS) of right breast  (Ductal carcinoma in situ (DCIS) of right breast [D05.11]) MD Shar Fletcher MD          Anesthesia Type: general  Last vitals  /91 (03/01/17 1131)    Temp 36.6 °C (97.8 °F) (03/01/17 1120)    Pulse 71 (03/01/17 1131)   Resp 16 (03/01/17 1131)    SpO2 98 % (03/01/17 1131)      Post Anesthesia Care and Evaluation    Patient location during evaluation: PACU  Patient participation: complete - patient participated  Level of consciousness: awake and alert  Pain score: 0  Pain management: adequate  Airway patency: patent  Anesthetic complications: No anesthetic complications    Cardiovascular status: acceptable  Respiratory status: acceptable  Hydration status: acceptable

## 2017-03-02 ENCOUNTER — TELEPHONE (OUTPATIENT)
Dept: MAMMOGRAPHY | Facility: CLINIC | Age: 52
End: 2017-03-02

## 2017-03-02 DIAGNOSIS — D05.11 DUCTAL CARCINOMA IN SITU (DCIS) OF RIGHT BREAST: Primary | ICD-10-CM

## 2017-03-02 LAB
CYTO UR: NORMAL
LAB AP CASE REPORT: NORMAL
Lab: NORMAL
PATH REPORT.FINAL DX SPEC: NORMAL
PATH REPORT.GROSS SPEC: NORMAL

## 2017-03-02 NOTE — TELEPHONE ENCOUNTER
I told her the lumpectomy specimen showed no residual DCIS and we therefore have very widely clear margins.  I have placed orders for her to see both radiation and the medical oncologist

## 2017-03-15 ENCOUNTER — APPOINTMENT (OUTPATIENT)
Dept: ONCOLOGY | Facility: CLINIC | Age: 52
End: 2017-03-15

## 2017-03-15 ENCOUNTER — LAB (OUTPATIENT)
Dept: LAB | Facility: HOSPITAL | Age: 52
End: 2017-03-15

## 2017-03-15 ENCOUNTER — DOCUMENTATION (OUTPATIENT)
Dept: ONCOLOGY | Facility: CLINIC | Age: 52
End: 2017-03-15

## 2017-03-15 ENCOUNTER — CONSULT (OUTPATIENT)
Dept: ONCOLOGY | Facility: CLINIC | Age: 52
End: 2017-03-15

## 2017-03-15 VITALS
HEIGHT: 66 IN | WEIGHT: 231.8 LBS | BODY MASS INDEX: 37.25 KG/M2 | SYSTOLIC BLOOD PRESSURE: 122 MMHG | RESPIRATION RATE: 16 BRPM | DIASTOLIC BLOOD PRESSURE: 78 MMHG | TEMPERATURE: 98 F | HEART RATE: 88 BPM | OXYGEN SATURATION: 98 %

## 2017-03-15 DIAGNOSIS — D68.52 HETEROZYGOUS FOR PROTHROMBIN G20210A MUTATION (HCC): ICD-10-CM

## 2017-03-15 DIAGNOSIS — D05.11 DUCTAL CARCINOMA IN SITU OF RIGHT BREAST: Primary | ICD-10-CM

## 2017-03-15 DIAGNOSIS — D68.59 PROTEIN C DEFICIENCY (HCC): ICD-10-CM

## 2017-03-15 LAB
ANION GAP SERPL CALCULATED.3IONS-SCNC: 11.6 MMOL/L
BASOPHILS # BLD AUTO: 0.02 10*3/MM3 (ref 0–0.1)
BASOPHILS NFR BLD AUTO: 0.2 % (ref 0–1.1)
BUN BLD-MCNC: 19 MG/DL (ref 6–20)
BUN/CREAT SERPL: 21.1 (ref 7.3–30)
CALCIUM SPEC-SCNC: 9.2 MG/DL (ref 8.5–10.2)
CHLORIDE SERPL-SCNC: 99 MMOL/L (ref 98–107)
CO2 SERPL-SCNC: 26.4 MMOL/L (ref 22–29)
CREAT BLD-MCNC: 0.9 MG/DL (ref 0.6–1.1)
DEPRECATED RDW RBC AUTO: 42.5 FL (ref 37–49)
EOSINOPHIL # BLD AUTO: 0.09 10*3/MM3 (ref 0–0.36)
EOSINOPHIL NFR BLD AUTO: 0.9 % (ref 1–5)
ERYTHROCYTE [DISTWIDTH] IN BLOOD BY AUTOMATED COUNT: 13.1 % (ref 11.7–14.5)
GFR SERPL CREATININE-BSD FRML MDRD: 66 ML/MIN/1.73
GLUCOSE BLD-MCNC: 133 MG/DL (ref 74–124)
HCT VFR BLD AUTO: 43.3 % (ref 34–45)
HGB BLD-MCNC: 14.4 G/DL (ref 11.5–14.9)
HOLD SPECIMEN: NORMAL
IMM GRANULOCYTES # BLD: 0.03 10*3/MM3 (ref 0–0.03)
IMM GRANULOCYTES NFR BLD: 0.3 % (ref 0–0.5)
LYMPHOCYTES # BLD AUTO: 2.97 10*3/MM3 (ref 1–3.5)
LYMPHOCYTES NFR BLD AUTO: 30.7 % (ref 20–49)
MCH RBC QN AUTO: 29.3 PG (ref 27–33)
MCHC RBC AUTO-ENTMCNC: 33.3 G/DL (ref 32–35)
MCV RBC AUTO: 88 FL (ref 83–97)
MONOCYTES # BLD AUTO: 0.53 10*3/MM3 (ref 0.25–0.8)
MONOCYTES NFR BLD AUTO: 5.5 % (ref 4–12)
NEUTROPHILS # BLD AUTO: 6.05 10*3/MM3 (ref 1.5–7)
NEUTROPHILS NFR BLD AUTO: 62.4 % (ref 39–75)
NRBC BLD MANUAL-RTO: 0 /100 WBC (ref 0–0)
PLATELET # BLD AUTO: 271 10*3/MM3 (ref 150–375)
PMV BLD AUTO: 9.7 FL (ref 8.9–12.1)
POTASSIUM BLD-SCNC: 3.8 MMOL/L (ref 3.5–4.7)
RBC # BLD AUTO: 4.92 10*6/MM3 (ref 3.9–5)
SODIUM BLD-SCNC: 137 MMOL/L (ref 134–145)
WBC NRBC COR # BLD: 9.69 10*3/MM3 (ref 4–10)
WHOLE BLOOD HOLD SPECIMEN: NORMAL
WHOLE BLOOD HOLD SPECIMEN: NORMAL

## 2017-03-15 PROCEDURE — 36415 COLL VENOUS BLD VENIPUNCTURE: CPT | Performed by: INTERNAL MEDICINE

## 2017-03-15 PROCEDURE — 99245 OFF/OP CONSLTJ NEW/EST HI 55: CPT | Performed by: INTERNAL MEDICINE

## 2017-03-15 PROCEDURE — 80048 BASIC METABOLIC PNL TOTAL CA: CPT | Performed by: INTERNAL MEDICINE

## 2017-03-15 RX ORDER — CHLORHEXIDINE GLUCONATE 0.12 MG/ML
RINSE ORAL
Refills: 5 | COMMUNITY
Start: 2017-03-06 | End: 2017-08-31

## 2017-03-15 NOTE — PROGRESS NOTES
History:     Reason For Consultation:   1. Right upper inner quadrant ductal carcinoma in situ, ER+   * status post lumpectomy 3/1/2017    Referring Provider:   Neal Sanchez MD  [address] [id number]    Records Obtained: Records of the patients history including those obtained from the referring provider were reviewed and summarized in detail.    HPI:   Connie Green presents for consultation of newly diagnosed DCIS.  She presented for routine screening mammogram in 2016 that revealed calcifications in the right breast at the 3 o'clock position.  Diagnostic mammogram was recommended which showed suspicious calcifications in the right breast at the 1:30 o'clock region with stereotactic biopsy recommended.  The biopsy specimen from 2017 revealed intermediate grade, ductal carcinoma in situ, estrogen receptor 96%, progesterone receptor 96%, Ki-67 8%, measuring at least 0.3 cm.  On 3/1/2017 patient underwent a needle localized right breast lumpectomy with pathology showing fibroinflammatory changes consistent with site of prior biopsy.  No residual ductal carcinoma in situ was identified.  All margins were free of atypia.    Patient also has a history of thrombophilia.  She has been evaluated by Dr Vidal in [place] and was tested for thrombophilia and has a heterozygous prothrombin gene mutation as well as a persistently, mildly low protein C of 63%.  She has been recommended to take a baby aspirin and use prophylactic Lovenox on long flights. Personal history is significant for late pregnancy stillbirth.  Family history significant for a father who  at age 42 from complications of hypercoagulable state along with multiple vascular catastrophes.  He had his first thrombosis in his upper extremity age 20 and then developed thrombosis in right lower extremity at age 25.  He also had multiple arterial thromboses throughout his life.    She is feeling well without new  concerns. She's healed well from surgery and doesn't really have any pain at surgical site. She is pre-menopausal without any symptoms of menopause. Her last menstrual period was in February.     Past Medical   Past Medical History   Diagnosis Date   • Cancer      right breast cancer   • Prothrombin gene mutation      Patient Active Problem List   Diagnosis   • Ductal carcinoma in situ of right breast   • Protein C deficiency   • Heterozygous for prothrombin [id number] mutation     Social History   Social History     Social History   • Marital status:      Spouse name: Yelitza   • Number of children: 3   • Years of education: N/A     Occupational History   • Not on file.     Social History Main Topics   • Smoking status: Never Smoker   • Smokeless tobacco: Not on file   • Alcohol use No   • Drug use: No   • Sexual activity: Not on file      Comment:      Other Topics Concern   • Not on file     Social History Narrative     Family History  Family History   Problem Relation Age of Onset   • Clotting disorder Father    • Heart failure Father    • Heart attack Father    • Alcohol abuse Brother    • Prostate cancer Brother 58   • Heart attack Paternal Grandmother    • Heart failure Paternal Grandmother      Medications    Current Outpatient Prescriptions:   •  aspirin 81 MG EC tablet, Take 81 mg by mouth Daily. HOLD PER MD INSTR, Disp: , Rfl:   •  Calcium Carb-Cholecalciferol (CALCIUM + D3 PO), Take 1 tablet by mouth Daily., Disp: , Rfl:   •  chlorhexidine (PERIDEX) 0.12 % solution, RINSE 15ML AFTER BREAKFAST AND BEFORE BEDTIME, Disp: , Rfl: 5  •  Melatonin-Pyridoxine (MELATIN PO), Take 2 tablets by mouth Every Night. Unsure of dose, Disp: , Rfl:   •  Multiple Vitamins-Minerals (MULTI COMPLETE PO), Take  by mouth., Disp: , Rfl:   •  Omega-3 Fatty Acids (FISH OIL) 1000 MG capsule capsule, Take 1,000 mg by mouth Daily With Breakfast. HOLD PER MD INSTR, Disp: , Rfl:   Allergies  Allergies   Allergen Reactions   •  "Codeine Nausea And Vomiting     Review of Systems  GENERAL: No change in appetite or weight; No fevers, chills, sweats.    SKIN: No nonhealing lesions. No rashes.  HEME/LYMPH: No easy bruising, bleeding. No swollen nodes.   EYES: No vision changes or diplopia.   ENT: No tinnitus, hearing loss, gum bleeding, epistaxis, hoarseness or dysphagia.   RESPIRATORY: No cough, shortness of breath, hemoptysis or wheezing.   CVS: No chest pain, palpitations, orthopnea, dyspnea on exertion or PND.   GI: No melena or hematochezia. No abdominal pain. No nausea, vomiting, constipation, diarrhea  : No lower tract obstructive symptoms, dysuria or hematuria.   MUSCULOSKELETAL: No bone pain.No joint stiffness.   NEUROLOGICAL: No global weakness, loss of consciousness or seizures.   PSYCHIATRIC: No increased nervousness, mood changes or depression.      Objective     Objective:     Vitals:    03/15/17 1436   BP: 122/78   Pulse: 88   Resp: 16   Temp: 98 °F (36.7 °C)   SpO2: 98%   Weight: 231 lb 12.8 oz (105 kg)   Height: 66.14\" (168 cm)  Comment: new    PainSc: 0-No pain     Current Status 3/15/2017   ECOG score 0     GENERAL:  Well-developed, well-nourished female in no acute distress.   SKIN:  Warm, dry without rashes, purpura or petechiae.  HEAD:  Normocephalic.  EYES:  Pupils equal, round and reactive to light.  EOMs intact.  Conjunctivae normal.  EARS:  Hearing intact.  NOSE:  Septum midline.  No excoriations or nasal discharge.  MOUTH:  Tongue is without ulcers. Lips normal.  THROAT:  Oropharynx without lesions or exudates.  NECK:  Supple with good range of motion; no masses  LYMPHATICS:  No cervical, supraclavicular, axillary adenopathy.  CHEST:  Lungs clear to percussion and auscultation. Good airflow. Right breast with well healing incision in upper inner quadrant.  CARDIAC:  Regular rate and rhythm without murmurs, rubs or gallops. Normal S1,S2.  ABDOMEN:  Soft, nontender, no hepatosplenomegaly, normal bowel " sounds  EXTREMITIES:  No clubbing, cyanosis or edema.  PSYCHIATRIC:  Normal affect and mood.     Labs and Imaging  Results for orders placed or performed in visit on 03/15/17   CBC Auto Differential   Result Value Ref Range    WBC 9.69 4.00 - 10.00 10*3/mm3    RBC 4.92 3.90 - 5.00 10*6/mm3    Hemoglobin 14.4 11.5 - 14.9 g/dL    Hematocrit 43.3 34.0 - 45.0 %    MCV 88.0 83.0 - 97.0 fL    MCH 29.3 27.0 - 33.0 pg    MCHC 33.3 32.0 - 35.0 g/dL    RDW 13.1 11.7 - 14.5 %    RDW-SD 42.5 37.0 - 49.0 fl    MPV 9.7 8.9 - 12.1 fL    Platelets 271 150 - 375 10*3/mm3    Neutrophil % 62.4 39.0 - 75.0 %    Lymphocyte % 30.7 20.0 - 49.0 %    Monocyte % 5.5 4.0 - 12.0 %    Eosinophil % 0.9 (L) 1.0 - 5.0 %    Basophil % 0.2 0.0 - 1.1 %    Immature Grans % 0.3 0.0 - 0.5 %    Neutrophils, Absolute 6.05 1.50 - 7.00 10*3/mm3    Lymphocytes, Absolute 2.97 1.00 - 3.50 10*3/mm3    Monocytes, Absolute 0.53 0.25 - 0.80 10*3/mm3    Eosinophils, Absolute 0.09 0.00 - 0.36 10*3/mm3    Basophils, Absolute 0.02 0.00 - 0.10 10*3/mm3    Immature Grans, Absolute 0.03 0.00 - 0.03 10*3/mm3    nRBC 0.0 0.0 - 0.0 /100 WBC     Pathology and imaging as above.     Assessment/Plan   Assessment/Plan:   1. Right breast, upper inner quadrant, ductal carcinoma in situ, ER 96%, UT 96%   * status post lumpectomy 3/1/2017    * We discussed the prognosis for her non-invasive breast cancer, particularly in terms of risks of local recurrences. We reviewed treatment recommendations including radiation therapy and possible chemoprevention with endocrine therapy.  We discussed that Tamoxifen provides benefit by decreasing ipsilateral and contralateral breast invasive and noninvasive cancers based on multiple NSABP clinic trials in estrogen receptor positive DCIS. However no overall survival benefit has been found.   * However, with her history of thrombophilia with heterozygous prothrombin gene mutation and slightly low protein C along with family history of thrombosis, I  think her risk of clotting with Tamoxifen is greater than her benefit. I also do not think that AI with ovarian suppression is in her best interest since this is DCIS and not invasive. We discussed the possibility of adding Arimidex once she's post-menopausal for chemoprevention. I think she could also be a candidate for mammogram and MRI due to her increased lifetime risk of malignancy/DCIS. When I see her back in 6 months, we'll order imaging. I encouraged self breast exams as well.    * She has been referred to radiation oncology and will see Dr Burns on Friday.     2.  Heterozygous prothrombin gene mutation and mildly low protein C (63%) with family history but no personal history of thrombosis, although she did have fetal demise at almost term. She is on a baby aspirin and has been encouraged to avoid any supplemental estrogens. Will repeat protein C today.     Follow-up in 6 months. I asked the patient to call for any questions, concerns, or new symptoms.

## 2017-03-17 ENCOUNTER — OFFICE VISIT (OUTPATIENT)
Dept: RADIATION ONCOLOGY | Facility: HOSPITAL | Age: 52
End: 2017-03-17

## 2017-03-17 ENCOUNTER — APPOINTMENT (OUTPATIENT)
Dept: RADIATION ONCOLOGY | Facility: HOSPITAL | Age: 52
End: 2017-03-17

## 2017-03-17 VITALS
OXYGEN SATURATION: 98 % | HEART RATE: 84 BPM | SYSTOLIC BLOOD PRESSURE: 136 MMHG | BODY MASS INDEX: 37.12 KG/M2 | DIASTOLIC BLOOD PRESSURE: 85 MMHG | WEIGHT: 231 LBS

## 2017-03-17 DIAGNOSIS — D05.11 DUCTAL CARCINOMA IN SITU OF RIGHT BREAST: Primary | ICD-10-CM

## 2017-03-17 PROCEDURE — 99243 OFF/OP CNSLTJ NEW/EST LOW 30: CPT | Performed by: RADIOLOGY

## 2017-03-17 PROCEDURE — G0463 HOSPITAL OUTPT CLINIC VISIT: HCPCS | Performed by: RADIOLOGY

## 2017-03-17 NOTE — PROGRESS NOTES
DIAGNOSIS and REASON FOR CONSULTATION: Ductal carcinoma in situ of right breast    Referring Provider:  Neal Sanchez MD  Patient Care Team:  No Known Provider as PCP - General  Berenice Rosa MD as Consulting Physician (Obstetrics and Gynecology)  Kateryna De Leon MD as Consulting Physician (Hematology and Oncology)  Radha Burns MD as Consulting Physician (Radiation Oncology)  Adriane Colindres MD (Internal Medicine)  Neal Sanchez MD as Referring Physician (Breast Surgery)    CHIEF COMPLAINT:  Ductal carcinoma in situ of right breast  HISTORY OF PRESENT ILLNESS:  The patient is a 51 y.o. year old female who was found to have an abnormality on routine screening mammogram dated November 29, 2016.  This revealed calcifications in the right breast which were new and diagnostic mammogram images on December 20, 2016 confirmed pleomorphic calcifications in the 1:30 o'clock position of the right breast.    She underwent stereotactic guided biopsy and clip placement on January 18, 2017 which revealed a ductal carcinoma in situ, intermediate grade, measuring at least 3 mm in greatest dimension.  The tissue was found to be positive for both estrogen and progesterone receptors with a favorable Ki-67.    She was seen by Dr. Sanchez and opted for breast conservation and underwent a needle localized right sided lumpectomy on March 1, 2017 which revealed no residual ductal carcinoma and only fibroinflammatory changes consistent with previous biopsy and focal ductal hyperplasia.    She has done well postoperatively and was referred on to the [facility] physicians who discussed the possibility of preventive endocrine therapy; however she does have a history of thrombophilia and a family history thrombosis so tamoxifen was not recommended.  They discussed consideration of an aromatase inhibitor once post-menopausal and she returns in 6 months.  She presents today to discuss the possibility of postoperative  radiation therapy.    Clinically, she is doing well. She feels well and has continued to work. She has no complaints on review of systems.    Past Medical History: she  has a past medical history of Cancer and Prothrombin gene mutation.    Past Surgical History:  she has a past surgical history that includes Laparoscopic tubal ligation; Endometrial ablation (2007); Breast biopsy (Right, 01/2017); Breast lumpectomy (Right, 3/1/2017); and Tubal ligation.    Meds:    Current Outpatient Prescriptions:   •  aspirin 81 MG EC tablet, Take 81 mg by mouth Daily. HOLD PER MD INSTR, Disp: , Rfl:   •  Calcium Carb-Cholecalciferol (CALCIUM + D3 PO), Take 1 tablet by mouth Daily., Disp: , Rfl:   •  chlorhexidine (PERIDEX) 0.12 % solution, RINSE 15ML AFTER BREAKFAST AND BEFORE BEDTIME, Disp: , Rfl: 5  •  Melatonin-Pyridoxine (MELATIN PO), Take 2 tablets by mouth Every Night. Unsure of dose, Disp: , Rfl:   •  Multiple Vitamins-Minerals (MULTI COMPLETE PO), Take  by mouth., Disp: , Rfl:   •  Omega-3 Fatty Acids (FISH OIL) 1000 MG capsule capsule, Take 1,000 mg by mouth Daily With Breakfast. HOLD PER MD INSTR, Disp: , Rfl:     Allergies:    Allergies   Allergen Reactions   • Codeine Nausea And Vomiting       Family History:  her family history includes Alcohol abuse in her brother; Clotting disorder in her father; Heart attack in her father and paternal grandmother; Heart failure in her father and paternal grandmother; Prostate cancer (age of onset: 58) in her brother.    Social History:  she  reports that she has never smoked. She does not have any smokeless tobacco history on file. She reports that she does not drink alcohol or use illicit drugs.    Pertinent Findings on   Review of Systems   Constitutional: Negative for appetite change, chills, diaphoresis, fatigue, fever and unexpected weight change.   HENT:   Negative for hearing loss, lump/mass, mouth sores, nosebleeds, sore throat, tinnitus, trouble swallowing and voice  change.    Eyes: Negative for eye problems and icterus.   Respiratory: Negative for chest tightness, cough, dizziness on exertion, hemoptysis, shortness of breath and wheezing.    Cardiovascular: Negative for chest pain, leg swelling and palpitations.   Gastrointestinal: Negative for abdominal distention, abdominal pain, blood in stool, constipation, diarrhea, nausea, rectal pain and vomiting.   Endocrine: Negative for hot flashes.   Genitourinary: Negative for bladder incontinence, difficulty urinating, dyspareunia, dysuria, frequency, hematuria, menstrual problem, nocturia, pelvic pain, vaginal bleeding and vaginal discharge.    Musculoskeletal: Negative for arthralgias, back pain, flank pain, gait problem, myalgias, neck pain and neck stiffness.   Skin: Negative for itching, rash and wound.   Neurological: Negative for dizziness, extremity weakness, gait problem, headaches, light-headedness, numbness, seizures and speech difficulty.   Hematological: Negative for adenopathy. Does not bruise/bleed easily.   Psychiatric/Behavioral: Negative for confusion, decreased concentration, depression, sleep disturbance and suicidal ideas. The patient is not nervous/anxious.    :  There were no vitals filed for this visit.    Performance Status: (0) Fully active, able to carry on all predisease performance without restriction    Pertinent Findings on:  Physical Exam   Constitutional: She is oriented to person, place, and time. She appears well-developed and well-nourished. She is cooperative.   HENT:   Head: Normocephalic.   Neck: Normal range of motion. No erythema present.   Pulmonary/Chest: Breath sounds normal. She has no wheezes. She exhibits no mass, no tenderness and no edema. Right breast exhibits no inverted nipple, no mass, no nipple discharge, no skin change and no tenderness. Left breast exhibits no inverted nipple, no mass, no nipple discharge, no skin change and no tenderness. There is no breast swelling.   The  left breast is without abnormality as is the left axilla. The right breast shows a healing lumpectomy incision over the upper inner aspect of the breast. There is no skin or nipple abnormality, no warmth or erythema.     Musculoskeletal: Normal range of motion.   Lymphadenopathy:     She has no cervical adenopathy.     She has no axillary adenopathy.        Right axillary: No pectoral adenopathy present.        Left axillary: No pectoral adenopathy present.       Right: No supraclavicular adenopathy present.        Left: No supraclavicular adenopathy present.   There is no palpable axillary, supraclavicular or cervical lymphadenopathy appreciated. No lymphedema is noted in either upper extremity.   Neurological: She is alert and oriented to person, place, and time.   Psychiatric: Her mood appears not anxious. Her affect is not angry. She does not exhibit a depressed mood.       Assessment:   1. Ductal carcinoma in situ of right breast         Plan:   We reviewed the specifics of her clinical, imaging and pathology findings today and also talked through the role of radiation therapy in breast conservation treatment and specifically in DCIS.  We talked thru the prognostic factors and the risk of local recurrence associated with those, both intradutal and invasive. We discussed specifically the size of the DCIS, margin status, grade with or without necrosis and the hormone receptor status. She expressed understanding and asked multiple questions.    She clearly has very good prognostics associated with her tumor and we reviewed specifically the risk of local recurrence at 5 and 10 years based on treatment choice in the [facility] nomogram:  With no further treatment being 9% and 15% respectively, with both chemoprevention and radiation therapy being 2% and 3% respectively and with RT alone given the concern regarding menopausal status and thrombosis being 4% and 6% respectively.  Clearly those numbers are quite low and we did  discuss at length the possibility of no further treatment if she wished.    We did talk thru the logistics of a course of intact whole breast treatment and specifically the logistics and side effects of this course of treatment  which may involve acutely, irritation of the skin, including erythema and possibly moist desquamation, tenderness of the musculature of the chest wall and mild fatigue. We discussed the long term possibility of mild hyperpigmentation and fibrosis of the breast, mild increased incidence of rib fracture and radiation pneumonitis.  We also discussed the importance of our treatment planning process in protecting these underlying structures as much as possible, specifically ribs and lung and I believe all her questions were answered to her satisfaction.      She is very concerned about the transportation time from [place] where she lives and works and  [place] and had already expressed a desire to have the radiation at [facility] if she decided to pursue it. To that end, I tried to answer all her questions regarding treatment choice and will make her an appointment to be seen there for a second opinion. She also sees dr. Sanchez on Monday and I encouraged her to discuss it further with him at that visit. She knows to call if I can be of any further help in her care or answer any further questions after her visit there.    I spent over 45  minutes face-to-face with the patient today and of that time, 35  minutes were spent counseling and coordinating care.

## 2017-03-17 NOTE — PATIENT INSTRUCTIONS
We will call you with appointment date/time for second opinion at [facility].

## 2017-03-20 ENCOUNTER — OFFICE VISIT (OUTPATIENT)
Dept: MAMMOGRAPHY | Facility: CLINIC | Age: 52
End: 2017-03-20

## 2017-03-20 VITALS
TEMPERATURE: 97.5 F | DIASTOLIC BLOOD PRESSURE: 70 MMHG | SYSTOLIC BLOOD PRESSURE: 125 MMHG | OXYGEN SATURATION: 97 % | HEART RATE: 78 BPM

## 2017-03-20 DIAGNOSIS — D05.11 DUCTAL CARCINOMA IN SITU OF RIGHT BREAST: Primary | ICD-10-CM

## 2017-03-20 PROCEDURE — 99024 POSTOP FOLLOW-UP VISIT: CPT | Performed by: SURGERY

## 2017-03-20 NOTE — PROGRESS NOTES
Chief Complaint: Connie Green is a  51 y.o. female, initially referred by No ref. provider found , who is here today for a postoperative visit.    History of Present Illness:  In the interim,Connie Green has had the following procedure and resultant pathology report: There was no residual DCIS in the lumpectomy specimen and we did not check lymph nodes.  She has also seen the radiation oncologists and the medical oncologist.  The patient has opted for no radiation which was one of the options given to her.    She has noted no redness, warmth,drainage, swelling at the incision site. Denies fever or chills.      Current Outpatient Prescriptions:   •  aspirin 81 MG EC tablet, Take 81 mg by mouth Daily. HOLD PER MD INSTR, Disp: , Rfl:   •  Calcium Carb-Cholecalciferol (CALCIUM + D3 PO), Take 1 tablet by mouth Daily., Disp: , Rfl:   •  chlorhexidine (PERIDEX) 0.12 % solution, RINSE 15ML AFTER BREAKFAST AND BEFORE BEDTIME, Disp: , Rfl: 5  •  Melatonin-Pyridoxine (MELATIN PO), Take 2 tablets by mouth Every Night. Unsure of dose, Disp: , Rfl:   •  Multiple Vitamins-Minerals (MULTI COMPLETE PO), Take  by mouth., Disp: , Rfl:   •  Omega-3 Fatty Acids (FISH OIL) 1000 MG capsule capsule, Take 1,000 mg by mouth Daily With Breakfast. HOLD PER MD INSTR, Disp: , Rfl:   Physical examination-there is a well-healing incision in the medial aspect of the right breast.  There are no signs of infection or drainage.    Assessment:  DCIS right breast status post lumpectomy.  Her appointments with the radiation and medical oncologists have already taken place.    Plan:  I will just need to see her on an as-needed basis and she will follow with the medical oncologists on a six-month basis.

## 2017-03-22 NOTE — PROGRESS NOTES
"Connie Green, 51, was seen for an initial social work appointment today.  She and her  came in to meet Dr. De Leon and hear her recommendations for continued treatment of the patient's breast cancer.  She had a right breast lumpectomy on 3/1/17 and said she was feeling \"pretty good.\"  She and her  live in [place], [place].  The patient is an  in a small business with 8 employees (no [place] protection), but she says, \"they love me\" .  She also has a stage 0 cancer and has been off only 5 days for her surgery.  The patient and her  have adult children.  The youngest is a college freshman in [place], [place].  There is a daughter in [place] and a son nearby. She says her kids have not been unduly worried about her.    I reviewed social work services and gave the patient and her  support group information.  They have contact information for Cori Dhillon LCSW and me and should feel free to call.  "

## 2017-07-19 ENCOUNTER — HOSPITAL ENCOUNTER (OUTPATIENT)
Dept: FAMILY MEDICINE CLINIC | Facility: CLINIC | Age: 52
Setting detail: SPECIMEN
Discharge: HOME | End: 2017-07-19
Attending: INTERNAL MEDICINE | Admitting: INTERNAL MEDICINE

## 2017-07-19 LAB
ALBUMIN SERPL-MCNC: 3.9 G/DL (ref 3.5–4.8)
ALBUMIN/GLOB SERPL: 1.1 {RATIO} (ref 1–1.7)
ALP SERPL-CCNC: 47 IU/L (ref 32–91)
ALT SERPL-CCNC: 15 IU/L (ref 14–54)
ANION GAP SERPL CALC-SCNC: 12.4 MMOL/L (ref 10–20)
AST SERPL-CCNC: 20 IU/L (ref 15–41)
BILIRUB SERPL-MCNC: 0.7 MG/DL (ref 0.3–1.2)
BUN SERPL-MCNC: 12 MG/DL (ref 8–20)
BUN/CREAT SERPL: 12 (ref 5.4–26.2)
CALCIUM SERPL-MCNC: 9.4 MG/DL (ref 8.9–10.3)
CHLORIDE SERPL-SCNC: 104 MMOL/L (ref 101–111)
CONV CO2: 26 MMOL/L (ref 22–32)
CONV TOTAL PROTEIN: 7.4 G/DL (ref 6.1–7.9)
CREAT UR-MCNC: 1 MG/DL (ref 0.4–1)
GLOBULIN UR ELPH-MCNC: 3.5 G/DL (ref 2.5–3.8)
GLUCOSE SERPL-MCNC: 94 MG/DL (ref 65–99)
POTASSIUM SERPL-SCNC: 4.4 MMOL/L (ref 3.6–5.1)
SODIUM SERPL-SCNC: 138 MMOL/L (ref 136–144)

## 2017-08-31 ENCOUNTER — OFFICE VISIT (OUTPATIENT)
Dept: OTHER | Facility: HOSPITAL | Age: 52
End: 2017-08-31

## 2017-08-31 ENCOUNTER — OFFICE VISIT (OUTPATIENT)
Dept: ONCOLOGY | Facility: CLINIC | Age: 52
End: 2017-08-31

## 2017-08-31 ENCOUNTER — APPOINTMENT (OUTPATIENT)
Dept: LAB | Facility: HOSPITAL | Age: 52
End: 2017-08-31

## 2017-08-31 VITALS
HEART RATE: 81 BPM | BODY MASS INDEX: 36.13 KG/M2 | OXYGEN SATURATION: 99 % | WEIGHT: 224.8 LBS | HEIGHT: 66 IN | RESPIRATION RATE: 12 BRPM | DIASTOLIC BLOOD PRESSURE: 80 MMHG | TEMPERATURE: 98.4 F | SYSTOLIC BLOOD PRESSURE: 112 MMHG

## 2017-08-31 VITALS — BODY MASS INDEX: 36 KG/M2 | HEIGHT: 66 IN | RESPIRATION RATE: 16 BRPM | WEIGHT: 224 LBS

## 2017-08-31 DIAGNOSIS — D05.11 DUCTAL CARCINOMA IN SITU OF RIGHT BREAST: Primary | ICD-10-CM

## 2017-08-31 DIAGNOSIS — D05.11 DUCTAL CARCINOMA IN SITU OF RIGHT BREAST: ICD-10-CM

## 2017-08-31 DIAGNOSIS — D68.52 HETEROZYGOUS FOR PROTHROMBIN G20210A MUTATION (HCC): ICD-10-CM

## 2017-08-31 PROCEDURE — 99213 OFFICE O/P EST LOW 20 MIN: CPT | Performed by: INTERNAL MEDICINE

## 2017-08-31 PROCEDURE — 99214 OFFICE O/P EST MOD 30 MIN: CPT | Performed by: NURSE PRACTITIONER

## 2017-08-31 PROCEDURE — G0463 HOSPITAL OUTPT CLINIC VISIT: HCPCS | Performed by: NURSE PRACTITIONER

## 2017-12-12 ENCOUNTER — APPOINTMENT (OUTPATIENT)
Dept: WOMENS IMAGING | Facility: HOSPITAL | Age: 52
End: 2017-12-12

## 2017-12-12 PROCEDURE — 77067 SCR MAMMO BI INCL CAD: CPT | Performed by: RADIOLOGY

## 2017-12-12 PROCEDURE — 77063 BREAST TOMOSYNTHESIS BI: CPT | Performed by: RADIOLOGY

## 2018-02-19 ENCOUNTER — LAB (OUTPATIENT)
Dept: LAB | Facility: HOSPITAL | Age: 53
End: 2018-02-19

## 2018-02-19 ENCOUNTER — OFFICE VISIT (OUTPATIENT)
Dept: ONCOLOGY | Facility: CLINIC | Age: 53
End: 2018-02-19

## 2018-02-19 VITALS
HEART RATE: 79 BPM | OXYGEN SATURATION: 99 % | WEIGHT: 225.1 LBS | HEIGHT: 66 IN | DIASTOLIC BLOOD PRESSURE: 84 MMHG | RESPIRATION RATE: 14 BRPM | SYSTOLIC BLOOD PRESSURE: 126 MMHG | BODY MASS INDEX: 36.17 KG/M2 | TEMPERATURE: 98.4 F

## 2018-02-19 DIAGNOSIS — D68.52 HETEROZYGOUS FOR PROTHROMBIN G20210A MUTATION (HCC): Primary | ICD-10-CM

## 2018-02-19 DIAGNOSIS — D05.11 DUCTAL CARCINOMA IN SITU OF RIGHT BREAST: ICD-10-CM

## 2018-02-19 DIAGNOSIS — D68.59 PROTEIN C DEFICIENCY (HCC): ICD-10-CM

## 2018-02-19 DIAGNOSIS — D05.11 DUCTAL CARCINOMA IN SITU OF RIGHT BREAST: Primary | ICD-10-CM

## 2018-02-19 DIAGNOSIS — Z91.89 INCREASED RISK OF BREAST CANCER: ICD-10-CM

## 2018-02-19 LAB
ALBUMIN SERPL-MCNC: 4.2 G/DL (ref 3.5–5.2)
ALBUMIN/GLOB SERPL: 1.1 G/DL (ref 1.1–2.4)
ALP SERPL-CCNC: 51 U/L (ref 38–116)
ALT SERPL W P-5'-P-CCNC: 11 U/L (ref 0–33)
ANION GAP SERPL CALCULATED.3IONS-SCNC: 12.8 MMOL/L
AST SERPL-CCNC: 15 U/L (ref 0–32)
BASOPHILS # BLD AUTO: 0.04 10*3/MM3 (ref 0–0.1)
BASOPHILS NFR BLD AUTO: 0.6 % (ref 0–1.1)
BILIRUB SERPL-MCNC: 0.5 MG/DL (ref 0.1–1.2)
BUN BLD-MCNC: 14 MG/DL (ref 6–20)
BUN/CREAT SERPL: 15.9 (ref 7.3–30)
CALCIUM SPEC-SCNC: 9.4 MG/DL (ref 8.5–10.2)
CHLORIDE SERPL-SCNC: 101 MMOL/L (ref 98–107)
CO2 SERPL-SCNC: 27.2 MMOL/L (ref 22–29)
CREAT BLD-MCNC: 0.88 MG/DL (ref 0.6–1.1)
DEPRECATED RDW RBC AUTO: 42.5 FL (ref 37–49)
EOSINOPHIL # BLD AUTO: 0.08 10*3/MM3 (ref 0–0.36)
EOSINOPHIL NFR BLD AUTO: 1.2 % (ref 1–5)
ERYTHROCYTE [DISTWIDTH] IN BLOOD BY AUTOMATED COUNT: 13 % (ref 11.7–14.5)
GFR SERPL CREATININE-BSD FRML MDRD: 67 ML/MIN/1.73
GLOBULIN UR ELPH-MCNC: 3.7 GM/DL (ref 1.8–3.5)
GLUCOSE BLD-MCNC: 99 MG/DL (ref 74–124)
HCT VFR BLD AUTO: 42.9 % (ref 34–45)
HGB BLD-MCNC: 14 G/DL (ref 11.5–14.9)
HOLD SPECIMEN: NORMAL
IMM GRANULOCYTES # BLD: 0.01 10*3/MM3 (ref 0–0.03)
IMM GRANULOCYTES NFR BLD: 0.1 % (ref 0–0.5)
LYMPHOCYTES # BLD AUTO: 3.21 10*3/MM3 (ref 1–3.5)
LYMPHOCYTES NFR BLD AUTO: 47.4 % (ref 20–49)
MCH RBC QN AUTO: 29 PG (ref 27–33)
MCHC RBC AUTO-ENTMCNC: 32.6 G/DL (ref 32–35)
MCV RBC AUTO: 88.8 FL (ref 83–97)
MONOCYTES # BLD AUTO: 0.52 10*3/MM3 (ref 0.25–0.8)
MONOCYTES NFR BLD AUTO: 7.7 % (ref 4–12)
NEUTROPHILS # BLD AUTO: 2.91 10*3/MM3 (ref 1.5–7)
NEUTROPHILS NFR BLD AUTO: 43 % (ref 39–75)
NRBC BLD MANUAL-RTO: 0 /100 WBC (ref 0–0)
PLATELET # BLD AUTO: 271 10*3/MM3 (ref 150–375)
PMV BLD AUTO: 10 FL (ref 8.9–12.1)
POTASSIUM BLD-SCNC: 3.9 MMOL/L (ref 3.5–4.7)
PROT SERPL-MCNC: 7.9 G/DL (ref 6.3–8)
RBC # BLD AUTO: 4.83 10*6/MM3 (ref 3.9–5)
SODIUM BLD-SCNC: 141 MMOL/L (ref 134–145)
WBC NRBC COR # BLD: 6.77 10*3/MM3 (ref 4–10)

## 2018-02-19 PROCEDURE — 36415 COLL VENOUS BLD VENIPUNCTURE: CPT | Performed by: INTERNAL MEDICINE

## 2018-02-19 PROCEDURE — 80053 COMPREHEN METABOLIC PANEL: CPT

## 2018-02-19 PROCEDURE — 85025 COMPLETE CBC W/AUTO DIFF WBC: CPT

## 2018-02-19 PROCEDURE — 99213 OFFICE O/P EST LOW 20 MIN: CPT | Performed by: INTERNAL MEDICINE

## 2018-02-19 PROCEDURE — 85303 CLOT INHIBIT PROT C ACTIVITY: CPT | Performed by: INTERNAL MEDICINE

## 2018-02-19 NOTE — PROGRESS NOTES
Subjective:     Reason for follow up:   1. 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 Patient had a mammogram in December that was benign.  She brings in a copy of her uterine ultrasounds which revealed enlarging fibroids.  She has discussed with Dr. Rosa and is not having bleeding or pain thus they will continue to monitor.  She had worried that if they did a hysterectomy she would need supplemental hormone therapy which she is not a candidate for.  She denies any new abnormalities in the breast. No new pains.      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     Social History   Social History     Social History   • Marital status:      Spouse name: Yelitza   • Number of children: 3   • Years of education: High school     Occupational History   •       [employer]     Social History Main Topics   • Smoking status: Never Smoker   • Smokeless tobacco: Not on file   • Alcohol use No   • Drug use: No   • Sexual activity: Not on file      Comment:      Other Topics Concern   • Not on file     Social History Narrative      Family History  Family History   Problem Relation Age of Onset   • Clotting disorder Father    • Heart failure Father    • Heart attack Father    • Heart disease Father    • Alcohol abuse Brother    • Prostate cancer Brother 51   • Hypertension Brother    • Heart attack Paternal Grandmother    • Heart failure Paternal Grandmother    • Heart disease Paternal Grandmother    • Hypertension Paternal Grandmother    • Diabetes Maternal Uncle    • Heart disease Maternal  "Grandmother    • Hypertension Maternal Grandmother    • Heart disease Maternal Grandfather    • Diabetes Maternal Grandfather    • Heart disease Paternal Grandfather    • Skin cancer Brother 58     Skin/neck cancer   • Hypertension Brother    • Thrombophilia Other      Allergies  Allergies   Allergen Reactions   • Codeine Nausea And Vomiting       Medications: The current medication list was reviewed in the EMR.    Review of Systems  Review of Systems   Constitutional: Negative for activity change, appetite change, chills, diaphoresis, fatigue, fever and unexpected weight change.   Respiratory: Negative for cough, chest tightness and shortness of breath.    Cardiovascular: Negative for chest pain, palpitations and leg swelling.   Gastrointestinal: Negative for abdominal pain, blood in stool, constipation, diarrhea, nausea and vomiting.   Musculoskeletal: Negative for arthralgias, joint swelling and myalgias.   Hematological: Negative for adenopathy. Does not bruise/bleed easily.       Objective   Objective:     Vitals:    02/19/18 1316   BP: 126/84   Pulse: 79   Resp: 14   Temp: 98.4 °F (36.9 °C)   TempSrc: Oral   SpO2: 99%   Weight: 102 kg (225 lb 1.6 oz)   Height: 168 cm (66.14\")   PainSc: 0-No pain     Current Status 2/19/2018   ECOG score 0     GENERAL: female comfortable, no acute distress  SKIN:  Warm, without rashes, purpura or petechiae.   EYES:  EOMs intact.  Conjunctivae normal. Pupils equal and reactive to light.   EARS:  Hearing intact.  LYMPHATICS:  No cervical, supraclavicular, axillary adenopathy.  RESP:  Lungs clear to percussion and auscultation. Good airflow. Normal effort.   CHEST: Mediport -No; Breast exam - Yes - Right breast without palpable masses or skin changes, no nipple discharge.  CARDIAC:  Regular rate and rhythm without murmurs, rubs or gallops. Normal S1,S2. Lower extremity edema:  No  GI:  Soft, nontender, normal bowel sounds  PSYCHIATRIC:  Normal affect and mood; alert and oriented x " 3; Insight and judgement appropriate         Labs and Imaging  Lab Results   Component Value Date    WBC 6.77 02/19/2018    HGB 14.0 02/19/2018    HCT 42.9 02/19/2018    MCV 88.8 02/19/2018     02/19/2018       Breast imaging: Mammogram 12/2017      Assessment/Plan     Assessment:   1. 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 I also did not think that AI with ovarian suppression is in her best interest since this is DCIS and not invasive. We previously discussed the possibility of adding Arimidex once she's post-menopausal for chemoprevention.    * I think she could also be a candidate for mammogram alternating with MRI due to her increased lifetime risk of malignancy/DCIS.    * MRI July, Mammo December  2.  Heterozygous prothrombin gene mutation and mildly low protein C (63%) with family history but no personal history of thrombosis, although she did have fetal demise at almost term. She is on a baby aspirin and has been encouraged to avoid any supplemental estrogens.   3. Uterine fibroids. Offered reassurance today.  I agree with Dr. Rosa's plan.       Plan:     1. MRI in July   2. Annual mammogram due December.   3. Follow-up in 5 months. I asked the patient to call for any questions, concerns, or new symptoms.

## 2018-02-23 LAB — PROT C PPP-ACNC: 70 % (ref 86–163)

## 2018-07-10 ENCOUNTER — HOSPITAL ENCOUNTER (OUTPATIENT)
Dept: MRI IMAGING | Facility: HOSPITAL | Age: 53
Discharge: HOME | End: 2018-07-10
Attending: INTERNAL MEDICINE | Admitting: INTERNAL MEDICINE

## 2018-07-10 DIAGNOSIS — D05.11 DUCTAL CARCINOMA IN SITU OF RIGHT BREAST: ICD-10-CM

## 2018-07-10 DIAGNOSIS — Z91.89 INCREASED RISK OF BREAST CANCER: ICD-10-CM

## 2018-07-10 LAB — CREAT BLDA-MCNC: 1 MG/DL (ref 0.6–1.3)

## 2018-07-10 PROCEDURE — A9577 INJ MULTIHANCE: HCPCS | Performed by: INTERNAL MEDICINE

## 2018-07-10 PROCEDURE — 82565 ASSAY OF CREATININE: CPT

## 2018-07-10 PROCEDURE — 0 GADOBENATE DIMEGLUMINE 529 MG/ML SOLUTION: Performed by: INTERNAL MEDICINE

## 2018-07-10 PROCEDURE — 0159T HC MRI BREAST COMPUTER ANALYSIS: CPT

## 2018-07-10 PROCEDURE — C8908 MRI W/O FOL W/CONT, BREAST,: HCPCS

## 2018-07-10 RX ADMIN — GADOBENATE DIMEGLUMINE 20 ML: 529 INJECTION, SOLUTION INTRAVENOUS at 13:29

## 2018-07-11 ENCOUNTER — TELEPHONE (OUTPATIENT)
Dept: ONCOLOGY | Facility: CLINIC | Age: 53
End: 2018-07-11

## 2018-07-19 PROBLEM — D05.11 DUCTAL CARCINOMA IN SITU OF RIGHT BREAST: Status: RESOLVED | Noted: 2017-03-15 | Resolved: 2018-07-19

## 2018-07-19 NOTE — PROGRESS NOTES
Subjective:     Reason for follow up:   1. 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 She had her MRI breasts recntly and it was benign. She had an pelvic US same day which looked good and showed only one fibroid. She also had labs that showed her to be post-menopausal with an estradiol of 16 and FSH of 86, but her last period was in February 2018. She's feeling well and trying to exercise and eat a healthy diet. No new breast concerns.     Reviewed, confirmed and updated history (past medical, social and family)   Past Medical   Past Medical History:   Diagnosis Date   • Cancer (CMS/HCC)     right breast cancer   • Prothrombin gene mutation (CMS/HCC)      Patient Active Problem List   Diagnosis   • Ductal carcinoma in situ of right breast   • Protein C deficiency (CMS/HCC)   • Heterozygous for prothrombin [id number] mutation (CMS/HCC)     Social History   Social History     Social History   • Marital status:      Spouse name: Yelitza   • Number of children: 3   • Years of education: High school     Occupational History   •       [employer]     Social History Main Topics   • Smoking status: Never Smoker   • Smokeless tobacco: Not on file   • Alcohol use No   • Drug use: No   • Sexual activity: Not on file      Comment:      Other Topics Concern   • Not on file     Social History Narrative   • No narrative on file      Family History  Family History   Problem Relation Age of Onset   • Clotting disorder Father    • Heart failure Father    • Heart attack Father    • Heart disease Father    • Alcohol abuse Brother    • Prostate cancer Brother 51   • Hypertension Brother    • Heart attack Paternal Grandmother    • Heart failure Paternal Grandmother    • Heart disease Paternal Grandmother    • Hypertension Paternal Grandmother   "  • Diabetes Maternal Uncle    • Heart disease Maternal Grandmother    • Hypertension Maternal Grandmother    • Heart disease Maternal Grandfather    • Diabetes Maternal Grandfather    • Heart disease Paternal Grandfather    • Skin cancer Brother 58        Skin/neck cancer   • Hypertension Brother    • Thrombophilia Other      Allergies  Allergies   Allergen Reactions   • Codeine Nausea And Vomiting       Medications: The current medication list was reviewed in the EMR.    Review of Systems  Review of Systems   Constitutional: Negative for activity change, appetite change, chills, diaphoresis, fatigue, fever and unexpected weight change.   Respiratory: Negative for cough, chest tightness and shortness of breath.    Cardiovascular: Negative for chest pain, palpitations and leg swelling.   Gastrointestinal: Negative for abdominal pain, blood in stool, constipation, diarrhea, nausea and vomiting.   Musculoskeletal: Negative for arthralgias, joint swelling and myalgias.   Hematological: Negative for adenopathy. Does not bruise/bleed easily.       Objective   Objective:     Vitals:    07/23/18 1256   BP: 124/84   Pulse: 67   Resp: 12   Temp: 98.1 °F (36.7 °C)   TempSrc: Oral   SpO2: 100%   Weight: 101 kg (223 lb)   Height: 168 cm (66.14\")   PainSc: 0-No pain     Current Status 7/23/2018   ECOG score 0     GENERAL:  Well-developed, well-nourished female in no acute distress. Vital signs reviewed.   SKIN:  Warm, dry without rashes, purpura or petechiae.  EYES:  Pupils equal, round and reactive to light.  EOMs intact.  Conjunctivae normal.  HEAD:  Normocephalic.  EARS/NOSE/MOUTH/THROAT:  Hearing intact. Septum midline.  No excoriations or nasal discharge. Lips normal.   NECK:  Supple with good range of motion; no thyromegaly or masses  LYMPHATICS:  No cervical, supraclavicular, axillary adenopathy.  RESP:  Lungs clear to percussion and auscultation. Good airflow. Normal respiratory effort.   CHEST: Breast exam- Yes, " describe: Bilateral breast exam performed without palpable masses, skin changes or nipple discharge.  Well healed surgical incision in the right breast.  CARDIAC:  Regular rate and rhythm without murmurs, rubs or gallops. Normal S1,S2. No edema  GI:  Soft, nontender, normal bowel sounds  MSK:  No clubbing or cyanosis, No joint swelling noted in hands  PSYCHIATRIC:  Normal affect and mood.  Alert and Oriented x 3.        Labs and Imaging  Lab Results   Component Value Date    WBC 6.77 02/19/2018    HGB 14.0 02/19/2018    HCT 42.9 02/19/2018    MCV 88.8 02/19/2018     02/19/2018     * I reviewed scans myself and concur with the below dictation.    Breast imaging: Mri Breast Bilateral With & Without Contrast    Result Date: 7/11/2018  Narrative: BILATERAL BREAST MRI WITH AND WITHOUT CONTRAST  HISTORY:  51-year-old female with history of DCIS in the right breast. Status post lumpectomy in March of last year. Patient at increased risk of cancer due to DCIS history. Screening for new cancers..   TECHNIQUE:  Multiple axial and sagittal T2-weighted images and Vibrant T1-weighted images were acquired, including T1-weighted images obtained after intravenous injection of MultiHance contrast. The [employer] workstation was utilized during image review aid in analysis of contrast enhancement kinetics.  COMPARISON: Previous mammograms dated 1/18/2017 and 3/1/2017 and bilateral mammogram dated 6/14/2018.  There is scattered stippled areas of enhancement throughout both breasts consistent with enhancement due to fibrocystic change. No areas of clumped suspicious above threshold enhancement are identified. There is a small briskly enhancing lymph node in the far posterior aspect of the left breast. No other discrete nodules are identified. There is a focus of ill-defined enhancement measuring approximately 12 x 6 mm in diameter in the upper inner quadrant of the right breast located approximately 6.7 cm from the nipple. This has  benign kinetics on the [employer] workstation. There is also a small focus of diminished signal in this area that is most likely hemosiderin deposition since this appears to correspond fairly well with the site of the patient's lumpectomy based on review of the needle localization mammogram. No abnormalities identified in this region on the patient's recent mammogram. This is therefore quite likely postop change. There is no axillary or internal mammary lymphadenopathy. There is no abnormal nipple enhancement.      Impression: Benign bilateral breast MRI showing only a small focus of minimal enhancement in the upper inner quadrant of the right breast consistent with residual postoperative change at a site of lumpectomy.  BI-RADS Category 2-benign findings  This report was finalized on 7/11/2018 4:32 PM by Dr. Ozzy Andrews M.D.            Assessment/Plan     Assessment:   1. 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 I also did not think that AI with ovarian suppression is in her best interest since this is DCIS and not invasive. Her labs 7/2018 are in the menopausal range, but her last period was just a few months ago, thus I'd prefer to wait to start any AI until next February. I discussed with patient today the possibility of AI for risk reduction in 6 months, and I don't think she's particularly interested.    * Plan mammogram alternating with MRI   * MRI July reviewed and benign, plan mammo December 2.  Heterozygous prothrombin gene mutation and mildly low protein C (63%) with family history but no personal history of thrombosis, although she did have fetal demise at  almost term. She is on a baby aspirin and has been encouraged to avoid any supplemental estrogens.     3. Uterine fibroids. Follows with Dr. Rosa. REcent US with improvement.     Plan:     1. Annual mammogram due December 2018  2. MRI in July 2019  3. Discuss further possibility of AI in 6 months for chemoprevention.   4. Follow-up in 6 months. I asked the patient to call for any questions, concerns, or new symptoms.    Medical Decision Making for this visit:  Labs reviewed or ordered (1), Imaging report reviewed or ordered (1), Independent visualization of image (2) and Established problem stable (1) - Number of stable problems 3

## 2018-07-23 ENCOUNTER — OFFICE VISIT (OUTPATIENT)
Dept: ONCOLOGY | Facility: CLINIC | Age: 53
End: 2018-07-23

## 2018-07-23 ENCOUNTER — APPOINTMENT (OUTPATIENT)
Dept: LAB | Facility: HOSPITAL | Age: 53
End: 2018-07-23

## 2018-07-23 VITALS
BODY MASS INDEX: 35.84 KG/M2 | TEMPERATURE: 98.1 F | HEIGHT: 66 IN | OXYGEN SATURATION: 100 % | SYSTOLIC BLOOD PRESSURE: 124 MMHG | WEIGHT: 223 LBS | RESPIRATION RATE: 12 BRPM | DIASTOLIC BLOOD PRESSURE: 84 MMHG | HEART RATE: 67 BPM

## 2018-07-23 DIAGNOSIS — D05.11 DUCTAL CARCINOMA IN SITU OF RIGHT BREAST: Primary | ICD-10-CM

## 2018-07-23 DIAGNOSIS — D68.52 HETEROZYGOUS FOR PROTHROMBIN G20210A MUTATION (HCC): ICD-10-CM

## 2018-07-23 DIAGNOSIS — D68.59 PROTEIN C DEFICIENCY (HCC): ICD-10-CM

## 2018-07-23 PROCEDURE — 99214 OFFICE O/P EST MOD 30 MIN: CPT | Performed by: INTERNAL MEDICINE

## 2018-07-23 PROCEDURE — G0463 HOSPITAL OUTPT CLINIC VISIT: HCPCS | Performed by: INTERNAL MEDICINE

## 2018-07-23 RX ORDER — HEPATITIS A VACCINE 1440 [IU]/ML
INJECTION, SUSPENSION INTRAMUSCULAR
Refills: 0 | COMMUNITY
Start: 2018-05-25 | End: 2020-01-28

## 2018-07-23 RX ORDER — FLUTICASONE PROPIONATE 50 MCG
1 SPRAY, SUSPENSION (ML) NASAL
COMMUNITY
Start: 2018-01-25 | End: 2021-10-14

## 2018-09-26 ENCOUNTER — HOSPITAL ENCOUNTER (OUTPATIENT)
Dept: FAMILY MEDICINE CLINIC | Facility: CLINIC | Age: 53
Setting detail: SPECIMEN
Discharge: HOME | End: 2018-09-26
Attending: INTERNAL MEDICINE | Admitting: INTERNAL MEDICINE

## 2018-09-26 LAB
ALBUMIN SERPL-MCNC: 3.8 G/DL (ref 3.5–4.8)
ALBUMIN/GLOB SERPL: 1 {RATIO} (ref 1–1.7)
ALP SERPL-CCNC: 51 IU/L (ref 32–91)
ALT SERPL-CCNC: 17 IU/L (ref 14–54)
ANION GAP SERPL CALC-SCNC: 12.9 MMOL/L (ref 10–20)
AST SERPL-CCNC: 21 IU/L (ref 15–41)
BASOPHILS # BLD AUTO: 0 10*3/UL (ref 0–0.2)
BASOPHILS NFR BLD AUTO: 0 % (ref 0–2)
BILIRUB SERPL-MCNC: 0.9 MG/DL (ref 0.3–1.2)
BUN SERPL-MCNC: 13 MG/DL (ref 8–20)
BUN/CREAT SERPL: 14.4 (ref 5.4–26.2)
CALCIUM SERPL-MCNC: 9.2 MG/DL (ref 8.9–10.3)
CHLORIDE SERPL-SCNC: 104 MMOL/L (ref 101–111)
CHOLEST SERPL-MCNC: 182 MG/DL
CHOLEST/HDLC SERPL: 3.2 {RATIO}
CONV CO2: 26 MMOL/L (ref 22–32)
CONV LDL CHOLESTEROL DIRECT: 118 MG/DL (ref 0–100)
CONV TOTAL PROTEIN: 7.6 G/DL (ref 6.1–7.9)
CREAT UR-MCNC: 0.9 MG/DL (ref 0.4–1)
DIFFERENTIAL METHOD BLD: (no result)
EOSINOPHIL # BLD AUTO: 0.1 10*3/UL (ref 0–0.3)
EOSINOPHIL # BLD AUTO: 1 % (ref 0–3)
ERYTHROCYTE [DISTWIDTH] IN BLOOD BY AUTOMATED COUNT: 13.6 % (ref 11.5–14.5)
GLOBULIN UR ELPH-MCNC: 3.8 G/DL (ref 2.5–3.8)
GLUCOSE SERPL-MCNC: 105 MG/DL (ref 65–99)
HCT VFR BLD AUTO: 39.5 % (ref 35–49)
HDLC SERPL-MCNC: 57 MG/DL
HGB BLD-MCNC: 13.4 G/DL (ref 12–15)
LDLC/HDLC SERPL: 2.1 {RATIO}
LIPID INTERPRETATION: ABNORMAL
LYMPHOCYTES # BLD AUTO: 2.5 10*3/UL (ref 0.8–4.8)
LYMPHOCYTES NFR BLD AUTO: 39 % (ref 18–42)
MCH RBC QN AUTO: 29.6 PG (ref 26–32)
MCHC RBC AUTO-ENTMCNC: 33.8 G/DL (ref 32–36)
MCV RBC AUTO: 87.7 FL (ref 80–94)
MONOCYTES # BLD AUTO: 0.5 10*3/UL (ref 0.1–1.3)
MONOCYTES NFR BLD AUTO: 7 % (ref 2–11)
NEUTROPHILS # BLD AUTO: 3.2 10*3/UL (ref 2.3–8.6)
NEUTROPHILS NFR BLD AUTO: 53 % (ref 50–75)
NRBC BLD AUTO-RTO: 0 /100{WBCS}
NRBC/RBC NFR BLD MANUAL: 0 10*3/UL
PLATELET # BLD AUTO: 269 10*3/UL (ref 150–450)
PMV BLD AUTO: 8.5 FL (ref 7.4–10.4)
POTASSIUM SERPL-SCNC: 3.9 MMOL/L (ref 3.6–5.1)
RBC # BLD AUTO: 4.51 10*6/UL (ref 4–5.4)
SODIUM SERPL-SCNC: 139 MMOL/L (ref 136–144)
TRIGL SERPL-MCNC: 74 MG/DL
VLDLC SERPL CALC-MCNC: 7.2 MG/DL
WBC # BLD AUTO: 6.3 10*3/UL (ref 4.5–11.5)

## 2018-12-14 ENCOUNTER — APPOINTMENT (OUTPATIENT)
Dept: WOMENS IMAGING | Facility: HOSPITAL | Age: 53
End: 2018-12-14

## 2018-12-14 PROCEDURE — 77063 BREAST TOMOSYNTHESIS BI: CPT | Performed by: RADIOLOGY

## 2018-12-14 PROCEDURE — 77067 SCR MAMMO BI INCL CAD: CPT | Performed by: RADIOLOGY

## 2019-01-02 ENCOUNTER — TELEPHONE (OUTPATIENT)
Dept: MAMMOGRAPHY | Facility: CLINIC | Age: 54
End: 2019-01-02

## 2019-01-02 NOTE — TELEPHONE ENCOUNTER
Connie called requesting Dr. Sanchez review her recent mammogram and past MRI. The recent mammogram recommends further evaluation on the right lymph node and Connie would like his opinion on the necessity of this since her MRI in July did not show any issues.     Please call her at [phone]    Thank you, Patty

## 2019-01-08 ENCOUNTER — TELEPHONE (OUTPATIENT)
Dept: MAMMOGRAPHY | Facility: CLINIC | Age: 54
End: 2019-01-08

## 2019-01-08 NOTE — TELEPHONE ENCOUNTER
I told her her mammogram was not downloaded onto our system but I do have the report.  The lymph nodes appear prominent on mammogram and I do think an ultrasound would be the best next test to see if they have a normal architecture.  She will proceed with that study.  It is scheduled for 2/15/2019.  If she does not hear from me 3 days after the procedure she will call to get the results.

## 2019-01-15 ENCOUNTER — APPOINTMENT (OUTPATIENT)
Dept: WOMENS IMAGING | Facility: HOSPITAL | Age: 54
End: 2019-01-15

## 2019-01-15 PROCEDURE — 77065 DX MAMMO INCL CAD UNI: CPT | Performed by: RADIOLOGY

## 2019-01-15 PROCEDURE — 76641 ULTRASOUND BREAST COMPLETE: CPT | Performed by: RADIOLOGY

## 2019-01-15 PROCEDURE — G0279 TOMOSYNTHESIS, MAMMO: HCPCS | Performed by: RADIOLOGY

## 2019-01-15 PROCEDURE — 77061 BREAST TOMOSYNTHESIS UNI: CPT | Performed by: RADIOLOGY

## 2019-01-18 ENCOUNTER — TELEPHONE (OUTPATIENT)
Dept: MAMMOGRAPHY | Facility: CLINIC | Age: 54
End: 2019-01-18

## 2019-01-18 ENCOUNTER — LAB (OUTPATIENT)
Dept: LAB | Facility: HOSPITAL | Age: 54
End: 2019-01-18

## 2019-01-18 ENCOUNTER — OFFICE VISIT (OUTPATIENT)
Dept: ONCOLOGY | Facility: CLINIC | Age: 54
End: 2019-01-18

## 2019-01-18 VITALS
HEIGHT: 66 IN | OXYGEN SATURATION: 99 % | HEART RATE: 87 BPM | DIASTOLIC BLOOD PRESSURE: 98 MMHG | SYSTOLIC BLOOD PRESSURE: 142 MMHG | RESPIRATION RATE: 14 BRPM | WEIGHT: 230.7 LBS | BODY MASS INDEX: 37.07 KG/M2 | TEMPERATURE: 97.7 F

## 2019-01-18 DIAGNOSIS — D05.11 DUCTAL CARCINOMA IN SITU OF RIGHT BREAST: ICD-10-CM

## 2019-01-18 DIAGNOSIS — D05.11 DUCTAL CARCINOMA IN SITU OF RIGHT BREAST: Primary | ICD-10-CM

## 2019-01-18 LAB
BASOPHILS # BLD AUTO: 0.03 10*3/MM3 (ref 0–0.1)
BASOPHILS NFR BLD AUTO: 0.4 % (ref 0–1.1)
DEPRECATED RDW RBC AUTO: 38.8 FL (ref 37–49)
EOSINOPHIL # BLD AUTO: 0.07 10*3/MM3 (ref 0–0.36)
EOSINOPHIL NFR BLD AUTO: 0.8 % (ref 1–5)
ERYTHROCYTE [DISTWIDTH] IN BLOOD BY AUTOMATED COUNT: 12.6 % (ref 11.7–14.5)
HCT VFR BLD AUTO: 41.7 % (ref 34–45)
HGB BLD-MCNC: 14.2 G/DL (ref 11.5–14.9)
IMM GRANULOCYTES # BLD AUTO: 0.03 10*3/MM3 (ref 0–0.03)
IMM GRANULOCYTES NFR BLD AUTO: 0.4 % (ref 0–0.5)
LYMPHOCYTES # BLD AUTO: 3.68 10*3/MM3 (ref 1–3.5)
LYMPHOCYTES NFR BLD AUTO: 44.3 % (ref 20–49)
MCH RBC QN AUTO: 28.9 PG (ref 27–33)
MCHC RBC AUTO-ENTMCNC: 34.1 G/DL (ref 32–35)
MCV RBC AUTO: 84.9 FL (ref 83–97)
MONOCYTES # BLD AUTO: 0.65 10*3/MM3 (ref 0.25–0.8)
MONOCYTES NFR BLD AUTO: 7.8 % (ref 4–12)
NEUTROPHILS # BLD AUTO: 3.85 10*3/MM3 (ref 1.5–7)
NEUTROPHILS NFR BLD AUTO: 46.3 % (ref 39–75)
NRBC BLD AUTO-RTO: 0 /100 WBC (ref 0–0)
PLATELET # BLD AUTO: 271 10*3/MM3 (ref 150–375)
PMV BLD AUTO: 9.9 FL (ref 8.9–12.1)
RBC # BLD AUTO: 4.91 10*6/MM3 (ref 3.9–5)
WBC NRBC COR # BLD: 8.31 10*3/MM3 (ref 4–10)

## 2019-01-18 PROCEDURE — 36415 COLL VENOUS BLD VENIPUNCTURE: CPT | Performed by: INTERNAL MEDICINE

## 2019-01-18 PROCEDURE — 85025 COMPLETE CBC W/AUTO DIFF WBC: CPT | Performed by: INTERNAL MEDICINE

## 2019-01-18 PROCEDURE — 99214 OFFICE O/P EST MOD 30 MIN: CPT | Performed by: INTERNAL MEDICINE

## 2019-01-18 RX ORDER — CALCIUM/MAGNESIUM/ZINC 333-133 MG
400 TABLET ORAL 3 TIMES DAILY
COMMUNITY
End: 2021-02-16

## 2019-01-18 NOTE — TELEPHONE ENCOUNTER
Just ALEC    Pt just calling to let us know [place] will be out of [place] starting this weekend in case we try to reach out to her for her U/S results.  She is very anxious so I called [facility] to check the status but this still has not been read.  She said we can reach out to her on the 27th.

## 2019-01-18 NOTE — PROGRESS NOTES
Subjective:     Reason for follow up:   1. Right breast, upper inner quadrant, ductal carcinoma in situ, ER 96%, NE 96%   * status post lumpectomy 3/1/2017        History of Present Ilness: Connie Green presents for follow-up of breast cancer. She had her MRI breasts in July 2018 and they were benign but more recently she had a mammogram in December that showed some adenopathy in the right axilla and repeat imaging with a diagnostic mammogram and ultrasound was performed 2 days ago but the results are not Back and we are waiting for them to call    She also had labs that showed her to be post-menopausal with an estradiol of 16 and FSH of 86, but her last period was in February 2018. She's feeling well and trying to exercise and eat a healthy diet. No new breast concerns.  She is not very motivated to start any kind of prevention at this time and as long as her repeat mammogram was normal she wants to wait another year to make this decision and I think this is reasonable    Reviewed, confirmed and updated history (past medical, social and family)   Past Medical   Past Medical History:   Diagnosis Date   • Cancer (CMS/HCC)     right breast cancer   • Prothrombin gene mutation (CMS/HCC)      Patient Active Problem List   Diagnosis   • Ductal carcinoma in situ of right breast   • Protein C deficiency (CMS/HCC)   • Heterozygous for prothrombin [id number] mutation (CMS/HCC)     Social History   Social History     Socioeconomic History   • Marital status:      Spouse name: Yelitza   • Number of children: 3   • Years of education: High school   • Highest education level: Not on file   Social Needs   • Financial resource strain: Not on file   • Food insecurity - worry: Not on file   • Food insecurity - inability: Not on file   • Transportation needs - medical: Not on file   • Transportation needs - non-medical: Not on file   Occupational History   • Occupation:      Comment: [employer]  "  Tobacco Use   • Smoking status: Never Smoker   Substance and Sexual Activity   • Alcohol use: No   • Drug use: No   • Sexual activity: Defer     Partners: Male     Comment:    Other Topics Concern   • Not on file   Social History Narrative   • Not on file      Family History  Family History   Problem Relation Age of Onset   • Clotting disorder Father    • Heart failure Father    • Heart attack Father    • Heart disease Father    • Alcohol abuse Brother    • Prostate cancer Brother 51   • Hypertension Brother    • Heart attack Paternal Grandmother    • Heart failure Paternal Grandmother    • Heart disease Paternal Grandmother    • Hypertension Paternal Grandmother    • Diabetes Maternal Uncle    • Heart disease Maternal Grandmother    • Hypertension Maternal Grandmother    • Heart disease Maternal Grandfather    • Diabetes Maternal Grandfather    • Heart disease Paternal Grandfather    • Skin cancer Brother 58        Skin/neck cancer   • Hypertension Brother    • Thrombophilia Other      Allergies  Allergies   Allergen Reactions   • Codeine Nausea And Vomiting       Medications: The current medication list was reviewed in the EMR.    Review of Systems  Review of Systems   Constitutional: Negative for chills, fatigue and fever.   Respiratory: Negative for cough, chest tightness and shortness of breath.    Cardiovascular: Negative for chest pain.   Gastrointestinal: Negative for abdominal pain, constipation, diarrhea, nausea and vomiting.   Genitourinary: Negative for difficulty urinating.   Musculoskeletal: Negative for arthralgias, joint swelling and myalgias.   Neurological: Negative for dizziness and headaches.   Psychiatric/Behavioral: The patient is not nervous/anxious.        Objective   Objective:     Vitals:    01/18/19 1241   BP: 142/98   Pulse: 87   Resp: 14   Temp: 97.7 °F (36.5 °C)   TempSrc: Oral   SpO2: 99%   Weight: 105 kg (230 lb 11.2 oz)   Height: 168 cm (66.14\")   PainSc: 0-No pain "     Current Status 1/18/2019   ECOG score 0     GENERAL:  Well-developed, well-nourished female in no acute distress. Vital signs reviewed.   SKIN:  Warm, dry without rashes, purpura or petechiae.  EYES:  Pupils equal, round and reactive to light.  EOMs intact.  Conjunctivae normal.  HEAD:  Normocephalic.  EARS/NOSE/MOUTH/THROAT:  Hearing intact. Septum midline.  No excoriations or nasal discharge. Lips normal.   NECK:  Supple with good range of motion; no thyromegaly or masses  LYMPHATICS:  No cervical, supraclavicular, axillary adenopathy.  RESP:  Lungs clear to percussion and auscultation. Good airflow. Normal respiratory effort.   CHEST: Breast exam- Yes, describe: Bilateral breast exam performed without a vague nodularity just above the lumpectomy in the right breast which feels like fibrocystic disease with no palpable adenopathy right axilla -left breast normal   CARDIAC:  Regular rate and rhythm without murmurs, rubs or gallops. Normal S1,S2. No edema  GI:  Soft, nontender, normal bowel sounds  MSK:  No clubbing or cyanosis, No joint swelling noted in hands  PSYCHIATRIC:  Normal affect and mood.  Alert and Oriented x 3.        Labs and Imaging  Lab Results   Component Value Date    WBC 8.31 01/18/2019    HGB 14.2 01/18/2019    HCT 41.7 01/18/2019    MCV 84.9 01/18/2019     01/18/2019     * I reviewed scans myself and concur with the below dictation.    Breast imaging: No results found.  [facility] pending [facility] from Tuesday      Assessment/Plan     Assessment:   1. 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 I also did not think that AI with  ovarian suppression is in her best interest since this is DCIS and not invasive. Her labs 7/2018 are in the menopausal range, but her last period was just a few months ago, thus I'd prefer to wait to start any AI until next February. I discussed with patient today the possibility of AI for risk reduction in 6 months, and I don't think she's particularly interested.    * Plan mammogram alternating with MRI   * MRI July reviewed and benign, plan mammo December  2.  Heterozygous prothrombin gene mutation and mildly low protein C (63%) with family history but no personal history of thrombosis, although she did have fetal demise at almost term. She is on a baby aspirin and has been encouraged to avoid any supplemental estrogens.     3. Uterine fibroids. Follows with Dr. Rosa. REcent US with improvement.     Plan:     1. Annual mammogram due December 2019 provided the repeat testing [facility] this week shows no obvious abnormality that needs biopsy  2. MRI in July 2019 if insurance approves  3. Discuss further possibility of AI in 12 months for chemoprevention.   4. Follow-up in 12 months. I asked the patient to call for any questions, concerns, or new symptoms.    Medical Decision Making for this visit:  Labs reviewed or ordered (1), Imaging report reviewed or ordered (1), Independent visualization of image (2) and Established problem stable (1) - Number of stable problems 3

## 2019-01-28 ENCOUNTER — TELEPHONE (OUTPATIENT)
Dept: ONCOLOGY | Facility: CLINIC | Age: 54
End: 2019-01-28

## 2019-01-28 NOTE — TELEPHONE ENCOUNTER
Pt. Calling to find out if she needs to get a biopsy.  Her u/s and spot compression of the rt. Breast was done on 1/15/19.  Pt. Received a letter that she probably needs a biospy done.  Wanting dr. Anderson's opinion.  Informed pt. Dr. Anderson is at the hospital this week, but I can s/w her tomorrow to get her opinion.  understanding noted.

## 2019-01-28 NOTE — TELEPHONE ENCOUNTER
----- Message from Vero Miles sent at 1/28/2019  9:07 AM EST -----  [phone]  Needs a call about results of ultrasound on the 15th. [facility] to do something other than Dr. Allen

## 2019-01-29 ENCOUNTER — TELEPHONE (OUTPATIENT)
Dept: ONCOLOGY | Facility: CLINIC | Age: 54
End: 2019-01-29

## 2019-01-29 NOTE — TELEPHONE ENCOUNTER
Pt. States she got a letter from [facility] that she would need a u/s guided biospy based on the mammogram result.  Inquiring if dr. Anderson is ok with this being done.  Reviewed with dr. Anderson, instructed pt. She needs to go ahead and have it done.  understanding noted.

## 2019-02-08 ENCOUNTER — APPOINTMENT (OUTPATIENT)
Dept: WOMENS IMAGING | Facility: HOSPITAL | Age: 54
End: 2019-02-08

## 2019-02-08 PROCEDURE — 19000 PUNCTURE ASPIR CYST BREAST: CPT | Performed by: RADIOLOGY

## 2019-02-08 PROCEDURE — 19083 BX BREAST 1ST LESION US IMAG: CPT | Performed by: RADIOLOGY

## 2019-03-26 ENCOUNTER — HOSPITAL ENCOUNTER (OUTPATIENT)
Dept: FAMILY MEDICINE CLINIC | Facility: CLINIC | Age: 54
Setting detail: SPECIMEN
Discharge: HOME | End: 2019-03-26
Attending: INTERNAL MEDICINE | Admitting: INTERNAL MEDICINE

## 2019-06-11 ENCOUNTER — HOSPITAL ENCOUNTER (OUTPATIENT)
Dept: FAMILY MEDICINE CLINIC | Facility: CLINIC | Age: 54
Setting detail: SPECIMEN
Discharge: HOME | End: 2019-06-11
Attending: INTERNAL MEDICINE | Admitting: INTERNAL MEDICINE

## 2019-06-11 LAB — 25(OH)D3 SERPL-MCNC: 41 NG/ML (ref 30–100)

## 2019-09-30 DIAGNOSIS — Z00.00 PREVENTATIVE HEALTH CARE: Primary | ICD-10-CM

## 2019-10-01 ENCOUNTER — LAB (OUTPATIENT)
Dept: FAMILY MEDICINE CLINIC | Facility: CLINIC | Age: 54
End: 2019-10-01

## 2019-10-01 DIAGNOSIS — Z00.00 PREVENTATIVE HEALTH CARE: ICD-10-CM

## 2019-10-01 LAB
25(OH)D3 SERPL-MCNC: 23.2 NG/ML (ref 30–100)
ALBUMIN SERPL-MCNC: 4 G/DL (ref 3.5–4.8)
ALBUMIN/GLOB SERPL: 1.1 G/DL (ref 1–1.7)
ALP SERPL-CCNC: 50 U/L (ref 32–91)
ALT SERPL W P-5'-P-CCNC: 17 U/L (ref 14–54)
ANION GAP SERPL CALCULATED.3IONS-SCNC: 10.9 MMOL/L (ref 5–15)
ARTICHOKE IGE QN: 116 MG/DL (ref 0–100)
AST SERPL-CCNC: 19 U/L (ref 15–41)
BILIRUB SERPL-MCNC: 0.7 MG/DL (ref 0.3–1.2)
BUN BLD-MCNC: 13 MG/DL (ref 8–20)
BUN/CREAT SERPL: 13 (ref 5.4–26.2)
CALCIUM SPEC-SCNC: 8.9 MG/DL (ref 8.9–10.3)
CHLORIDE SERPL-SCNC: 103 MMOL/L (ref 101–111)
CHOLEST SERPL-MCNC: 184 MG/DL
CO2 SERPL-SCNC: 26 MMOL/L (ref 22–32)
CREAT BLD-MCNC: 1 MG/DL (ref 0.4–1)
DEPRECATED RDW RBC AUTO: 41.6 FL (ref 37–54)
ERYTHROCYTE [DISTWIDTH] IN BLOOD BY AUTOMATED COUNT: 13.5 % (ref 12.3–15.4)
GFR SERPL CREATININE-BSD FRML MDRD: 58 ML/MIN/1.73
GLOBULIN UR ELPH-MCNC: 3.6 GM/DL (ref 2.5–3.8)
GLUCOSE BLD-MCNC: 104 MG/DL (ref 65–99)
HBA1C MFR BLD: 5.8 % (ref 3.5–5.6)
HCT VFR BLD AUTO: 42 % (ref 34–46.6)
HDLC SERPL QL: 3.12
HDLC SERPL-MCNC: 59 MG/DL
HGB BLD-MCNC: 13.9 G/DL (ref 12–15.9)
LDLC/HDLC SERPL: 1.88 {RATIO}
LYMPHOCYTES # BLD MANUAL: 1.83 10*3/MM3 (ref 0.7–3.1)
LYMPHOCYTES NFR BLD MANUAL: 10 % (ref 5–12)
LYMPHOCYTES NFR BLD MANUAL: 29 % (ref 19.6–45.3)
MCH RBC QN AUTO: 28.9 PG (ref 26.6–33)
MCHC RBC AUTO-ENTMCNC: 33.1 G/DL (ref 31.5–35.7)
MCV RBC AUTO: 87.5 FL (ref 79–97)
MONOCYTES # BLD AUTO: 0.63 10*3/MM3 (ref 0.1–0.9)
NEUTROPHILS # BLD AUTO: 3.84 10*3/MM3 (ref 1.7–7)
NEUTROPHILS NFR BLD MANUAL: 61 % (ref 42.7–76)
NEUTS VAC BLD QL SMEAR: NORMAL
PLAT MORPH BLD: NORMAL
PLATELET # BLD AUTO: 254 10*3/MM3 (ref 140–450)
PMV BLD AUTO: 8.8 FL (ref 6–12)
POTASSIUM BLD-SCNC: 3.9 MMOL/L (ref 3.6–5.1)
PROT SERPL-MCNC: 7.6 G/DL (ref 6.1–7.9)
RBC # BLD AUTO: 4.8 10*6/MM3 (ref 3.77–5.28)
RBC MORPH BLD: NORMAL
SCAN SLIDE: NORMAL
SODIUM BLD-SCNC: 136 MMOL/L (ref 136–144)
TOXIC GRANULATION: NORMAL
TRIGL SERPL-MCNC: 70 MG/DL
TSH SERPL DL<=0.05 MIU/L-ACNC: 2.02 UIU/ML (ref 0.34–5.6)
VLDLC SERPL-MCNC: 14 MG/DL
WBC NRBC COR # BLD: 6.3 10*3/MM3 (ref 3.4–10.8)

## 2019-10-01 PROCEDURE — 83036 HEMOGLOBIN GLYCOSYLATED A1C: CPT | Performed by: INTERNAL MEDICINE

## 2019-10-01 PROCEDURE — 36415 COLL VENOUS BLD VENIPUNCTURE: CPT

## 2019-10-01 PROCEDURE — 85025 COMPLETE CBC W/AUTO DIFF WBC: CPT | Performed by: INTERNAL MEDICINE

## 2019-10-01 PROCEDURE — 84443 ASSAY THYROID STIM HORMONE: CPT | Performed by: INTERNAL MEDICINE

## 2019-10-01 PROCEDURE — 85007 BL SMEAR W/DIFF WBC COUNT: CPT | Performed by: INTERNAL MEDICINE

## 2019-10-01 PROCEDURE — 80053 COMPREHEN METABOLIC PANEL: CPT | Performed by: INTERNAL MEDICINE

## 2019-10-01 PROCEDURE — 82306 VITAMIN D 25 HYDROXY: CPT | Performed by: INTERNAL MEDICINE

## 2019-10-01 PROCEDURE — 80061 LIPID PANEL: CPT | Performed by: INTERNAL MEDICINE

## 2019-10-08 ENCOUNTER — OFFICE VISIT (OUTPATIENT)
Dept: FAMILY MEDICINE CLINIC | Facility: CLINIC | Age: 54
End: 2019-10-08

## 2019-10-08 VITALS
WEIGHT: 225 LBS | OXYGEN SATURATION: 99 % | HEART RATE: 81 BPM | BODY MASS INDEX: 36.16 KG/M2 | DIASTOLIC BLOOD PRESSURE: 92 MMHG | RESPIRATION RATE: 20 BRPM | HEIGHT: 66 IN | TEMPERATURE: 97.5 F | SYSTOLIC BLOOD PRESSURE: 132 MMHG

## 2019-10-08 DIAGNOSIS — Z00.00 PREVENTATIVE HEALTH CARE: Primary | ICD-10-CM

## 2019-10-08 PROCEDURE — 99396 PREV VISIT EST AGE 40-64: CPT | Performed by: INTERNAL MEDICINE

## 2019-10-08 NOTE — PROGRESS NOTES
Subjective   Connie Green is a 54 y.o. female.     Here for an annual exam  Had mammogram earlier this year  Had to have biopsy/aspiration - was normal  Did cologuard as well - also negative  Otherwise has been doing well  Trying to stay active - at least 30 min of active steps daily  Tries to eat well bc of pre-dm  Watching carbs, sweets  Sees gyn for pap/pelvic and mammogram         The following portions of the patient's history were reviewed and updated as appropriate: allergies, current medications, past family history, past medical history, past social history, past surgical history and problem list.    Review of Systems   Constitutional: Negative for fatigue and fever.   HENT: Negative for congestion, ear pain, rhinorrhea and sore throat.    Eyes: Negative for blurred vision and itching.   Respiratory: Negative for cough and shortness of breath.    Cardiovascular: Negative for chest pain and palpitations.   Gastrointestinal: Negative for abdominal pain, diarrhea and vomiting.   Endocrine: Negative for polydipsia and polyuria.   Genitourinary: Negative for dysuria, frequency, hematuria and urgency.   Musculoskeletal: Negative for joint swelling and myalgias.   Skin: Negative for rash and skin lesions.   Neurological: Negative for dizziness, numbness and headache.   Psychiatric/Behavioral: Negative for depressed mood. The patient is not nervous/anxious.          Current Outpatient Medications:   •  aspirin 81 MG EC tablet, Take 81 mg by mouth Daily. HOLD PER MD SILVEIRA, Disp: , Rfl:   •  Calcium Carb-Cholecalciferol (CALCIUM + D3 PO), Take 1,200 mg by mouth 2 (Two) Times a Day., Disp: , Rfl:   •  Calcium-Magnesium-Vitamin D (CALCIUM 1200+D3 PO), Take  by mouth., Disp: , Rfl:   •  Cranberry 500 MG tablet, Take  by mouth., Disp: , Rfl:   •  CRANBERRY PO, Take 4,200 mg by mouth 2 (Two) Times a Day., Disp: , Rfl:   •  Echinacea 400 MG capsule, Take 400 mg by mouth 3 (Three) Times a Day., Disp: , Rfl:   •   "fluticasone (FLONASE) 50 MCG/ACT nasal spray, 1 spray into each nostril., Disp: , Rfl:   •  Linda, Zingiber officinalis, (LINDA ROOT) 550 MG capsule, Take 550 mg by mouth 2 (Two) Times a Day., Disp: , Rfl:   •  HAVRIX 1440 EL U/ML vaccine, ADM 1ML IM UTD, Disp: , Rfl: 0  •  Multiple Vitamins-Minerals (HEALTHY EYES PO), Take  by mouth., Disp: , Rfl:   •  Multiple Vitamins-Minerals (MULTI COMPLETE PO), Take  by mouth., Disp: , Rfl:   •  Omega-3 Fatty Acids (FISH OIL) 1000 MG capsule capsule, Take 1,200 mg by mouth 2 (Two) Times a Day. HOLD PER MD INSTR, Disp: , Rfl:     Objective   /92 (BP Location: Right arm, Patient Position: Sitting, Cuff Size: Adult)   Pulse 81   Temp 97.5 °F (36.4 °C) (Oral)   Resp 20   Ht 167.6 cm (66\")   Wt 102 kg (225 lb)   SpO2 99%   BMI 36.32 kg/m²   Physical Exam   Constitutional: She is oriented to person, place, and time. She appears well-developed and well-nourished. No distress.   HENT:   Head: Normocephalic and atraumatic.   Right Ear: External ear normal.   Left Ear: External ear normal.   Mouth/Throat: Oropharynx is clear and moist. No oropharyngeal exudate.   Eyes: Conjunctivae and EOM are normal. Right eye exhibits no discharge. Left eye exhibits no discharge. No scleral icterus.   Neck: Normal range of motion. Neck supple. No thyromegaly present.   Cardiovascular: Normal rate, regular rhythm and normal heart sounds. Exam reveals no gallop and no friction rub.   No murmur heard.  Pulmonary/Chest: Effort normal and breath sounds normal. No respiratory distress. She has no wheezes. She has no rales.   Abdominal: Soft. Bowel sounds are normal. She exhibits no distension. There is no tenderness. There is no guarding.   Musculoskeletal: Normal range of motion. She exhibits no edema or deformity.   Lymphadenopathy:     She has no cervical adenopathy.   Neurological: She is alert and oriented to person, place, and time. No cranial nerve deficit.   Skin: Skin is warm and " dry. No rash noted. She is not diaphoretic. No erythema.   Psychiatric: She has a normal mood and affect. Her behavior is normal. Thought content normal.   Vitals reviewed.        Assessment/Plan   Problems Addressed this Visit     None      Visit Diagnoses     Preventative health care    -  Primary          Doing well overall  utd on screenings  a1c increased slightly from 5.7 to 5.8  Encouraged pt to stay active and continue to limit sweets/carbs  Ok not to check labs in 6 months - can recheck in 1 year       Procedures

## 2020-01-07 ENCOUNTER — APPOINTMENT (OUTPATIENT)
Dept: WOMENS IMAGING | Facility: HOSPITAL | Age: 55
End: 2020-01-07

## 2020-01-07 PROCEDURE — 77067 SCR MAMMO BI INCL CAD: CPT | Performed by: RADIOLOGY

## 2020-01-07 PROCEDURE — 77063 BREAST TOMOSYNTHESIS BI: CPT | Performed by: RADIOLOGY

## 2020-01-28 ENCOUNTER — LAB (OUTPATIENT)
Dept: LAB | Facility: HOSPITAL | Age: 55
End: 2020-01-28

## 2020-01-28 ENCOUNTER — OFFICE VISIT (OUTPATIENT)
Dept: ONCOLOGY | Facility: CLINIC | Age: 55
End: 2020-01-28

## 2020-01-28 VITALS
OXYGEN SATURATION: 98 % | BODY MASS INDEX: 37 KG/M2 | RESPIRATION RATE: 16 BRPM | SYSTOLIC BLOOD PRESSURE: 133 MMHG | TEMPERATURE: 98.4 F | HEIGHT: 66 IN | HEART RATE: 83 BPM | WEIGHT: 230.2 LBS | DIASTOLIC BLOOD PRESSURE: 90 MMHG

## 2020-01-28 DIAGNOSIS — D05.11 DUCTAL CARCINOMA IN SITU OF RIGHT BREAST: Primary | ICD-10-CM

## 2020-01-28 LAB
ALBUMIN SERPL-MCNC: 4.2 G/DL (ref 3.5–5.2)
ALBUMIN/GLOB SERPL: 1.2 G/DL (ref 1.1–2.4)
ALP SERPL-CCNC: 52 U/L (ref 38–116)
ALT SERPL W P-5'-P-CCNC: 25 U/L (ref 0–33)
ANION GAP SERPL CALCULATED.3IONS-SCNC: 11.6 MMOL/L (ref 5–15)
AST SERPL-CCNC: 18 U/L (ref 0–32)
BASOPHILS # BLD AUTO: 0.03 10*3/MM3 (ref 0–0.2)
BASOPHILS NFR BLD AUTO: 0.4 % (ref 0–1.5)
BILIRUB SERPL-MCNC: 0.4 MG/DL (ref 0.2–1.2)
BUN BLD-MCNC: 17 MG/DL (ref 6–20)
BUN/CREAT SERPL: 18.7 (ref 7.3–30)
CALCIUM SPEC-SCNC: 9.4 MG/DL (ref 8.5–10.2)
CHLORIDE SERPL-SCNC: 101 MMOL/L (ref 98–107)
CO2 SERPL-SCNC: 28.4 MMOL/L (ref 22–29)
CREAT BLD-MCNC: 0.91 MG/DL (ref 0.6–1.1)
DEPRECATED RDW RBC AUTO: 44.2 FL (ref 37–54)
EOSINOPHIL # BLD AUTO: 0.11 10*3/MM3 (ref 0–0.4)
EOSINOPHIL NFR BLD AUTO: 1.3 % (ref 0.3–6.2)
ERYTHROCYTE [DISTWIDTH] IN BLOOD BY AUTOMATED COUNT: 13.7 % (ref 12.3–15.4)
GFR SERPL CREATININE-BSD FRML MDRD: 64 ML/MIN/1.73
GLOBULIN UR ELPH-MCNC: 3.5 GM/DL (ref 1.8–3.5)
GLUCOSE BLD-MCNC: 104 MG/DL (ref 74–124)
HCT VFR BLD AUTO: 41.1 % (ref 34–46.6)
HGB BLD-MCNC: 13.3 G/DL (ref 12–15.9)
IMM GRANULOCYTES # BLD AUTO: 0.03 10*3/MM3 (ref 0–0.05)
IMM GRANULOCYTES NFR BLD AUTO: 0.4 % (ref 0–0.5)
LYMPHOCYTES # BLD AUTO: 2.58 10*3/MM3 (ref 0.7–3.1)
LYMPHOCYTES NFR BLD AUTO: 30.8 % (ref 19.6–45.3)
MCH RBC QN AUTO: 28.8 PG (ref 26.6–33)
MCHC RBC AUTO-ENTMCNC: 32.4 G/DL (ref 31.5–35.7)
MCV RBC AUTO: 89 FL (ref 79–97)
MONOCYTES # BLD AUTO: 0.71 10*3/MM3 (ref 0.1–0.9)
MONOCYTES NFR BLD AUTO: 8.5 % (ref 5–12)
NEUTROPHILS # BLD AUTO: 4.92 10*3/MM3 (ref 1.7–7)
NEUTROPHILS NFR BLD AUTO: 58.6 % (ref 42.7–76)
NRBC BLD AUTO-RTO: 0 /100 WBC (ref 0–0.2)
PLATELET # BLD AUTO: 298 10*3/MM3 (ref 140–450)
PMV BLD AUTO: 9.1 FL (ref 6–12)
POTASSIUM BLD-SCNC: 4 MMOL/L (ref 3.5–4.7)
PROT SERPL-MCNC: 7.7 G/DL (ref 6.3–8)
RBC # BLD AUTO: 4.62 10*6/MM3 (ref 3.77–5.28)
SODIUM BLD-SCNC: 141 MMOL/L (ref 134–145)
WBC NRBC COR # BLD: 8.38 10*3/MM3 (ref 3.4–10.8)

## 2020-01-28 PROCEDURE — 36415 COLL VENOUS BLD VENIPUNCTURE: CPT

## 2020-01-28 PROCEDURE — 85025 COMPLETE CBC W/AUTO DIFF WBC: CPT

## 2020-01-28 PROCEDURE — 80053 COMPREHEN METABOLIC PANEL: CPT

## 2020-01-28 PROCEDURE — 99214 OFFICE O/P EST MOD 30 MIN: CPT | Performed by: INTERNAL MEDICINE

## 2020-01-28 RX ORDER — BIOTIN 1000 MCG
TABLET,CHEWABLE ORAL
COMMUNITY
End: 2021-10-14

## 2020-01-28 RX ORDER — CHOLECALCIFEROL (VITAMIN D3) 125 MCG
5 CAPSULE ORAL
COMMUNITY
End: 2021-10-14

## 2020-01-28 RX ORDER — ANASTROZOLE 1 MG/1
1 TABLET ORAL DAILY
Qty: 30 TABLET | Refills: 6 | Status: SHIPPED | OUTPATIENT
Start: 2020-01-28 | End: 2020-02-27

## 2020-01-28 RX ORDER — PAROXETINE 7.5 MG/1
CAPSULE ORAL
COMMUNITY
Start: 2020-01-08 | End: 2021-10-14

## 2020-01-28 NOTE — PROGRESS NOTES
Subjective:     Reason for follow up:   1. 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 She had her MRI breasts in July 2018 and they were benign but insurance denied repeat MRIs in July 2019.    She had a mammogram 2 weeks ago which was thankfully benign and she is doing well overall  She is never had a DEXA scan but she is on calcium and vitamin D and I recommended we do this before we consider an aromatase inhibitor    She also had labs that showed her to be post-menopausal with an estradiol of 16 and FSH of 86, but her last period was in February 2018.     We discussed the side effect profile of aromatase inhibitors .The side effects and toxicities of the Aromatase inhibitors was discussed with the patient including, hot flashes, mood swings and hair thinning.Significant arthralgias and worsening bone density were also discussed. Baseline bone density evaluation was ordered.  she's feeling well and trying to exercise and eat a healthy diet. No new breast concerns.  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)     Social History   Social History     Socioeconomic History   • Marital status:      Spouse name: Yelitza   • Number of  children: 3   • Years of education: High school   • Highest education level: Not on file   Occupational History   • Occupation:      Comment: [employer]   Tobacco Use   • Smoking status: Never Smoker   • Smokeless tobacco: Never Used   Substance and Sexual Activity   • Alcohol use: No   • Drug use: No   • Sexual activity: Defer     Partners: Male     Comment:       Family History  Family History   Problem Relation Age of Onset   • Clotting disorder Father    • Heart failure Father    • Heart attack Father    • Heart disease Father    • Alcohol abuse Brother    • Prostate cancer Brother 51   • Hypertension Brother    • Heart attack Paternal Grandmother    • Heart failure Paternal Grandmother    • Heart disease Paternal Grandmother    • Hypertension Paternal Grandmother    • Diabetes Maternal Uncle    • Heart disease Maternal Grandmother    • Hypertension Maternal Grandmother    • Heart disease Maternal Grandfather    • Diabetes Maternal Grandfather    • Heart disease Paternal Grandfather    • Skin cancer Brother 58        Skin/neck cancer   • Hypertension Brother    • Thrombophilia Other      Allergies  Allergies   Allergen Reactions   • Codeine Nausea And Vomiting       Medications: The current medication list was reviewed in the EMR.    Review of Systems  Review of Systems   Constitutional: Negative.  Negative for chills, fatigue and fever.   Respiratory: Negative.  Negative for cough, chest tightness and shortness of breath.    Cardiovascular: Negative.  Negative for chest pain.   Gastrointestinal: Negative.  Negative for abdominal pain, constipation, diarrhea, nausea and vomiting.   Genitourinary: Negative for difficulty urinating.   Musculoskeletal: Negative for arthralgias, joint swelling and myalgias.   Neurological: Negative for dizziness and headaches.   Psychiatric/Behavioral: The patient is not nervous/anxious.        Objective   Objective:     Vitals:    01/28/20 0751  "  BP: 133/90   Pulse: 83   Resp: 16   Temp: 98.4 °F (36.9 °C)   SpO2: 98%   Weight: 104 kg (230 lb 3.2 oz)   Height: 167.6 cm (65.98\")  Comment: new ht w/o shoes   PainSc: 0-No pain     Current Status 1/28/2020   ECOG score 0     GENERAL:  Well-developed, well-nourished female in no acute distress. Vital signs reviewed.   SKIN:  Warm, dry without rashes, purpura or petechiae.  EYES:  Pupils equal, round and reactive to light.  EOMs intact.  Conjunctivae normal.  HEAD:  Normocephalic.  EARS/NOSE/MOUTH/THROAT:  Hearing intact. Septum midline.  No excoriations or nasal discharge. Lips normal.   NECK:  Supple with good range of motion; no thyromegaly or masses  LYMPHATICS:  No cervical, supraclavicular, axillary adenopathy.  RESP:  Lungs clear to percussion and auscultation. Good airflow. Normal respiratory effort.  No abdomen  CHEST: Breast exam- Yes, describe: Bilateral breast exam performed allergies detected bilaterally   cARDIAC:  Regular rate and rhythm without murmurs, rubs or gallops. Normal S1,S2. No edema  GI:  Soft, nontender, normal bowel sounds  MSK:  No clubbing or cyanosis, No joint swelling noted in hands  PSYCHIATRIC:  Normal affect and mood.  Alert and Oriented x 3.        Labs and Imaging  Lab Results   Component Value Date    WBC 8.38 01/28/2020    HGB 13.3 01/28/2020    HCT 41.1 01/28/2020    MCV 89.0 01/28/2020     01/28/2020     * I reviewed scans myself and concur with the below dictation.      SCANNED - MAMMO           Assessment/Plan     Assessment:   1. 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 I also did not think that AI with ovarian " suppression is in her best interest since this is DCIS and not invasive. Her labs 7/2018 are in the menopausal range, but her last period was just a few months ago, thus I'd prefer to wait to start any AI until next February. I discussed with patient today the possibility of AI for risk reduction in 6 months, and I don't think she's particularly interested.    * Plan mammogram alternating with MRI   * MRI July reviewed and benign, plan mammo December              *Insurance not covering MRIs              *Trial of Arimidex planned if bone density shows no osteoporosis  2.  Heterozygous prothrombin gene mutation and mildly low protein C (63%) with family history but no personal history of thrombosis, although she did have fetal demise at almost term. She is on a baby aspirin and has been encouraged to avoid any supplemental estrogens.     3. Uterine fibroids. Follows with Dr. Rosa.     Plan:     1. DEXA scan and if this is normal or shows mild osteopenia would recommend Arimidex 1 mg daily for chemoprevention  2. Follow-up in 6 months if she is taking Arimidex and if not in 12 months. I asked the patient to call for any questions, concerns, or new symptoms.    Medical Decision Making for this visit:  Labs reviewed or ordered (1), Imaging report reviewed or ordered (1), Independent visualization of image (2) and Established problem stable (1) - Number of stable problems 3

## 2020-02-04 ENCOUNTER — HOSPITAL ENCOUNTER (OUTPATIENT)
Dept: BONE DENSITY | Facility: HOSPITAL | Age: 55
Discharge: HOME | End: 2020-02-04
Admitting: INTERNAL MEDICINE

## 2020-02-04 DIAGNOSIS — D05.11 DUCTAL CARCINOMA IN SITU OF RIGHT BREAST: ICD-10-CM

## 2020-02-04 PROCEDURE — 77080 DXA BONE DENSITY AXIAL: CPT

## 2020-02-06 ENCOUNTER — TELEPHONE (OUTPATIENT)
Dept: ONCOLOGY | Facility: CLINIC | Age: 55
End: 2020-02-06

## 2020-02-06 NOTE — TELEPHONE ENCOUNTER
Called to discuss patient's DEXA scan with her and that she may begin the Arimidex.  The patient is hesitant to start this medication as she is not used to taking medication she reports.  I will have her return in 2 months for review by myself at a time with Dr. Anderson is also in the office so we can check in on her in close follow-up see how she is tolerating the Arimidex and if she is not tolerating this well discuss other options since she is quite concerned regarding starting this medication.  I did answer all questions she had regarding possible side effects and encouraged her to call in the interim if she had any questions or concerns.

## 2020-02-13 ENCOUNTER — TELEPHONE (OUTPATIENT)
Dept: ONCOLOGY | Facility: CLINIC | Age: 55
End: 2020-02-13

## 2020-02-13 NOTE — TELEPHONE ENCOUNTER
Patient still has multiple questions regarding the Arimidex and would like to speak with Dr. Anderson regarding this.  She is asking why we do not follow serial hormone levels as she is on the Arimidex and stating that she is sensitive to medications.  She is quite concerned about taking the Arimidex.  Her sister, who works within the [facility] also has some medic questions for Dr. Anderson as well.  Afterward a follow-up appointment however the patient states she just met with Dr. Anderson.  I will send Dr. Anderson a message to return the patient's phone call, the patient knows that she is out of the office for the next couple of weeks.  The patient did try to send her a message in my chart however was unable to link to Dr. Anderson since Dr. De Leon was her initial physician within our practice.  I will message our office staff to see if they can assist with linking her chart to Dr. Anderson for future communications.  The patient was pleased with the plan of action and will await starting the Arimidex.

## 2020-06-10 ENCOUNTER — TELEPHONE (OUTPATIENT)
Dept: FAMILY MEDICINE CLINIC | Facility: CLINIC | Age: 55
End: 2020-06-10

## 2020-06-10 ENCOUNTER — TELEPHONE (OUTPATIENT)
Dept: ONCOLOGY | Facility: CLINIC | Age: 55
End: 2020-06-10

## 2020-06-10 DIAGNOSIS — Z00.00 PREVENTATIVE HEALTH CARE: Primary | ICD-10-CM

## 2020-06-10 NOTE — TELEPHONE ENCOUNTER
Patient wants to cancel 8/4 appointment.    She was supposed to be taking a Paroxetine Mesylate and she never started it.  She states she will keep the appointment in February, 2021.    [phone]

## 2020-06-10 NOTE — TELEPHONE ENCOUNTER
Patient is seeing Dr Colindres on 10/13/2020 for her annual physical and is coming in under [facility] on 10/6/2020 for the fasting labwork.  Please put orders in.

## 2020-10-13 ENCOUNTER — OFFICE VISIT (OUTPATIENT)
Dept: FAMILY MEDICINE CLINIC | Facility: CLINIC | Age: 55
End: 2020-10-13

## 2020-10-13 ENCOUNTER — LAB (OUTPATIENT)
Dept: FAMILY MEDICINE CLINIC | Facility: CLINIC | Age: 55
End: 2020-10-13

## 2020-10-13 VITALS
HEART RATE: 80 BPM | HEIGHT: 66 IN | RESPIRATION RATE: 16 BRPM | BODY MASS INDEX: 35.52 KG/M2 | TEMPERATURE: 96.4 F | WEIGHT: 221 LBS | DIASTOLIC BLOOD PRESSURE: 82 MMHG | OXYGEN SATURATION: 99 % | SYSTOLIC BLOOD PRESSURE: 110 MMHG

## 2020-10-13 DIAGNOSIS — Z00.00 PREVENTATIVE HEALTH CARE: ICD-10-CM

## 2020-10-13 DIAGNOSIS — E66.9 CLASS 2 OBESITY WITHOUT SERIOUS COMORBIDITY WITH BODY MASS INDEX (BMI) OF 35.0 TO 35.9 IN ADULT, UNSPECIFIED OBESITY TYPE: ICD-10-CM

## 2020-10-13 DIAGNOSIS — Z00.00 PREVENTATIVE HEALTH CARE: Primary | ICD-10-CM

## 2020-10-13 LAB
25(OH)D3 SERPL-MCNC: 58.7 NG/ML (ref 30–100)
BASOPHILS # BLD AUTO: 0.03 10*3/MM3 (ref 0–0.2)
BASOPHILS NFR BLD AUTO: 0.5 % (ref 0–1.5)
DEPRECATED RDW RBC AUTO: 42.1 FL (ref 37–54)
EOSINOPHIL # BLD AUTO: 0.06 10*3/MM3 (ref 0–0.4)
EOSINOPHIL NFR BLD AUTO: 1 % (ref 0.3–6.2)
ERYTHROCYTE [DISTWIDTH] IN BLOOD BY AUTOMATED COUNT: 12.8 % (ref 12.3–15.4)
HBA1C MFR BLD: 5.7 % (ref 3.5–5.6)
HCT VFR BLD AUTO: 42.2 % (ref 34–46.6)
HGB BLD-MCNC: 13.6 G/DL (ref 12–15.9)
IMM GRANULOCYTES # BLD AUTO: 0.01 10*3/MM3 (ref 0–0.05)
IMM GRANULOCYTES NFR BLD AUTO: 0.2 % (ref 0–0.5)
LYMPHOCYTES # BLD AUTO: 2.87 10*3/MM3 (ref 0.7–3.1)
LYMPHOCYTES NFR BLD AUTO: 45.5 % (ref 19.6–45.3)
MCH RBC QN AUTO: 29 PG (ref 26.6–33)
MCHC RBC AUTO-ENTMCNC: 32.2 G/DL (ref 31.5–35.7)
MCV RBC AUTO: 90 FL (ref 79–97)
MONOCYTES # BLD AUTO: 0.45 10*3/MM3 (ref 0.1–0.9)
MONOCYTES NFR BLD AUTO: 7.1 % (ref 5–12)
NEUTROPHILS NFR BLD AUTO: 2.89 10*3/MM3 (ref 1.7–7)
NEUTROPHILS NFR BLD AUTO: 45.7 % (ref 42.7–76)
NRBC BLD AUTO-RTO: 0 /100 WBC (ref 0–0.2)
PLATELET # BLD AUTO: 263 10*3/MM3 (ref 140–450)
PMV BLD AUTO: 10.9 FL (ref 6–12)
RBC # BLD AUTO: 4.69 10*6/MM3 (ref 3.77–5.28)
WBC # BLD AUTO: 6.31 10*3/MM3 (ref 3.4–10.8)

## 2020-10-13 PROCEDURE — 82306 VITAMIN D 25 HYDROXY: CPT | Performed by: INTERNAL MEDICINE

## 2020-10-13 PROCEDURE — 83036 HEMOGLOBIN GLYCOSYLATED A1C: CPT | Performed by: INTERNAL MEDICINE

## 2020-10-13 PROCEDURE — 85025 COMPLETE CBC W/AUTO DIFF WBC: CPT | Performed by: INTERNAL MEDICINE

## 2020-10-13 PROCEDURE — 84443 ASSAY THYROID STIM HORMONE: CPT | Performed by: INTERNAL MEDICINE

## 2020-10-13 PROCEDURE — 80053 COMPREHEN METABOLIC PANEL: CPT | Performed by: INTERNAL MEDICINE

## 2020-10-13 PROCEDURE — 99396 PREV VISIT EST AGE 40-64: CPT | Performed by: INTERNAL MEDICINE

## 2020-10-13 PROCEDURE — 80061 LIPID PANEL: CPT | Performed by: INTERNAL MEDICINE

## 2020-10-13 PROCEDURE — 36415 COLL VENOUS BLD VENIPUNCTURE: CPT

## 2020-10-13 NOTE — PROGRESS NOTES
Subjective   Connie Green is a 55 y.o. female.     Pt is here for an annual physical  She is due for labs today  She's been feeling a bit more stressed  Hasn't been doing well with diet/exercise  Trying to be as careful as possible  Since she's around high risk people    Aside from the stressors, she's been doing fairly well  No chest pain, SOA  No abd pain, N/V/D  No hematuria, dysuria  No fever, headache  No dizziness  No cough       The following portions of the patient's history were reviewed and updated as appropriate: allergies, current medications, past family history, past medical history, past social history, past surgical history and problem list.    Review of Systems   Constitutional: Negative for fatigue and fever.   HENT: Negative for congestion, ear pain, rhinorrhea and sore throat.    Eyes: Negative for blurred vision and itching.   Respiratory: Negative for cough and shortness of breath.    Cardiovascular: Negative for chest pain and palpitations.   Gastrointestinal: Negative for abdominal pain, diarrhea and vomiting.   Endocrine: Negative for polydipsia and polyuria.   Genitourinary: Negative for dysuria, frequency, hematuria and urgency.   Musculoskeletal: Negative for joint swelling and myalgias.   Skin: Negative for rash and skin lesions.   Neurological: Negative for dizziness, numbness and headache.   Psychiatric/Behavioral: Positive for stress. Negative for depressed mood. The patient is not nervous/anxious.          Current Outpatient Medications:   •  aspirin 81 MG EC tablet, Take 81 mg by mouth Daily. HOLD PER MD INSTR, Disp: , Rfl:   •  Biotin 1000 MCG chewable tablet, Chew., Disp: , Rfl:   •  Calcium Carb-Cholecalciferol (CALCIUM + D3 PO), Take 1,200 mg by mouth 2 (Two) Times a Day., Disp: , Rfl:   •  Cranberry 500 MG tablet, Take  by mouth., Disp: , Rfl:   •  Echinacea 400 MG capsule, Take 400 mg by mouth 3 (Three) Times a Day., Disp: , Rfl:   •  fluticasone (FLONASE) 50 MCG/ACT nasal  "spray, 1 spray into each nostril., Disp: , Rfl:   •  Linda, Zingiber officinalis, (LINDA ROOT) 550 MG capsule, Take 550 mg by mouth 2 (Two) Times a Day., Disp: , Rfl:   •  melatonin 5 MG tablet tablet, Take 5 mg by mouth., Disp: , Rfl:   •  Multiple Vitamins-Minerals (HEALTHY EYES PO), Take  by mouth., Disp: , Rfl:   •  Multiple Vitamins-Minerals (MULTI COMPLETE PO), Take  by mouth., Disp: , Rfl:   •  Omega-3 Fatty Acids (FISH OIL) 1000 MG capsule capsule, Take 1,200 mg by mouth 2 (Two) Times a Day. HOLD PER MD INSTR, Disp: , Rfl:   •  PARoxetine Mesylate 7.5 MG capsule, TK 1 C PO QD, Disp: , Rfl:     Objective   /82 (BP Location: Left arm, Patient Position: Sitting, Cuff Size: Adult)   Pulse 80   Temp 96.4 °F (35.8 °C) (Temporal)   Resp 16   Ht 167 cm (65.75\")   Wt 100 kg (221 lb)   SpO2 99%   BMI 35.94 kg/m²   Physical Exam  Vitals signs reviewed.   Constitutional:       General: She is not in acute distress.     Appearance: She is well-developed. She is not diaphoretic.   HENT:      Head: Normocephalic and atraumatic.      Right Ear: Tympanic membrane, ear canal and external ear normal.      Left Ear: Tympanic membrane, ear canal and external ear normal.      Mouth/Throat:      Pharynx: No oropharyngeal exudate.   Eyes:      General: No scleral icterus.        Right eye: No discharge.         Left eye: No discharge.      Conjunctiva/sclera: Conjunctivae normal.   Neck:      Musculoskeletal: Normal range of motion and neck supple.      Thyroid: No thyromegaly.   Cardiovascular:      Rate and Rhythm: Normal rate and regular rhythm.      Heart sounds: Normal heart sounds. No murmur. No friction rub. No gallop.    Pulmonary:      Effort: Pulmonary effort is normal. No respiratory distress.      Breath sounds: Normal breath sounds. No wheezing or rales.   Abdominal:      General: Bowel sounds are normal. There is no distension.      Palpations: Abdomen is soft.      Tenderness: There is no abdominal " tenderness. There is no guarding.   Musculoskeletal: Normal range of motion.         General: No deformity.   Lymphadenopathy:      Cervical: No cervical adenopathy.   Skin:     General: Skin is warm and dry.      Findings: No erythema or rash.   Neurological:      Mental Status: She is alert and oriented to person, place, and time.      Cranial Nerves: No cranial nerve deficit.   Psychiatric:         Behavior: Behavior normal.         Thought Content: Thought content normal.           Assessment/Plan   Problems Addressed this Visit     None      Visit Diagnoses     Preventative health care    -  Primary    Class 2 obesity without serious comorbidity with body mass index (BMI) of 35.0 to 35.9 in adult, unspecified obesity type          Diagnoses       Codes Comments    Preventative health care    -  Primary ICD-10-CM: Z00.00  ICD-9-CM: V70.0     Class 2 obesity without serious comorbidity with body mass index (BMI) of 35.0 to 35.9 in adult, unspecified obesity type     ICD-10-CM: E66.9, Z68.35  ICD-9-CM: 278.00, V85.35         Check labs today (lab orders already entered)  Encourage diet/exercise - she has lost about 10 lbs this year, thus far  Pt has already had flu vaccine this year         Procedures

## 2020-10-14 LAB
ALBUMIN SERPL-MCNC: 4.4 G/DL (ref 3.5–5.2)
ALBUMIN/GLOB SERPL: 1.2 G/DL
ALP SERPL-CCNC: 59 U/L (ref 39–117)
ALT SERPL W P-5'-P-CCNC: 13 U/L (ref 1–33)
ANION GAP SERPL CALCULATED.3IONS-SCNC: 7.5 MMOL/L (ref 5–15)
AST SERPL-CCNC: 18 U/L (ref 1–32)
BILIRUB SERPL-MCNC: 0.4 MG/DL (ref 0–1.2)
BUN SERPL-MCNC: 16 MG/DL (ref 6–20)
BUN/CREAT SERPL: 18.4 (ref 7–25)
CALCIUM SPEC-SCNC: 9.8 MG/DL (ref 8.6–10.5)
CHLORIDE SERPL-SCNC: 104 MMOL/L (ref 98–107)
CHOLEST SERPL-MCNC: 183 MG/DL (ref 0–200)
CO2 SERPL-SCNC: 29.5 MMOL/L (ref 22–29)
CREAT SERPL-MCNC: 0.87 MG/DL (ref 0.57–1)
GFR SERPL CREATININE-BSD FRML MDRD: 67 ML/MIN/1.73
GLOBULIN UR ELPH-MCNC: 3.6 GM/DL
GLUCOSE SERPL-MCNC: 100 MG/DL (ref 65–99)
HDLC SERPL-MCNC: 59 MG/DL (ref 40–60)
LDLC SERPL CALC-MCNC: 111 MG/DL (ref 0–100)
LDLC/HDLC SERPL: 1.87 {RATIO}
POTASSIUM SERPL-SCNC: 4.1 MMOL/L (ref 3.5–5.2)
PROT SERPL-MCNC: 8 G/DL (ref 6–8.5)
SODIUM SERPL-SCNC: 141 MMOL/L (ref 136–145)
TRIGL SERPL-MCNC: 68 MG/DL (ref 0–150)
TSH SERPL DL<=0.05 MIU/L-ACNC: 2.23 UIU/ML (ref 0.27–4.2)
VLDLC SERPL-MCNC: 13 MG/DL (ref 5–40)

## 2021-01-13 ENCOUNTER — APPOINTMENT (OUTPATIENT)
Dept: WOMENS IMAGING | Facility: HOSPITAL | Age: 56
End: 2021-01-13

## 2021-01-13 PROCEDURE — 77063 BREAST TOMOSYNTHESIS BI: CPT | Performed by: RADIOLOGY

## 2021-01-13 PROCEDURE — 77067 SCR MAMMO BI INCL CAD: CPT | Performed by: RADIOLOGY

## 2021-01-18 ENCOUNTER — TELEPHONE (OUTPATIENT)
Dept: GENERAL RADIOLOGY | Facility: HOSPITAL | Age: 56
End: 2021-01-18

## 2021-01-18 ENCOUNTER — TELEPHONE (OUTPATIENT)
Dept: ONCOLOGY | Facility: CLINIC | Age: 56
End: 2021-01-18

## 2021-01-18 DIAGNOSIS — R92.8 ABNORMALITY OF RIGHT BREAST ON SCREENING MAMMOGRAM: ICD-10-CM

## 2021-01-18 DIAGNOSIS — D05.11 DUCTAL CARCINOMA IN SITU OF RIGHT BREAST: Primary | ICD-10-CM

## 2021-01-18 NOTE — TELEPHONE ENCOUNTER
----- Message from Pricilla Lopez RN sent at 1/18/2021  1:41 PM EST -----  Regarding: RE: Diagnostic Mammo and u/s  That is fine. We have been running into that a lot with them. Guess there is not much we can do. Especially since her screening one was there, its better that it is at the same location so they can compare results.  ----- Message -----  From: Janice Otero  Sent: 1/18/2021   1:28 PM EST  To: Pricilla Lopez RN  Subject: RE: Diagnostic Mammo and u/s                     The 1st luis is not till Tuesday 2/9. Is that gonna be ok?  ----- Message -----  From: Pricilla Lopez RN  Sent: 1/18/2021  12:08 PM EST  To: [facility] On[facility] Herb [facility]  Pool  Subject: Diagnostic Mammo and u/s                         Pt needs scheduled at [facility]... had her screening mammo there and it was abnormal. She is aware you will be calling with appt.

## 2021-01-18 NOTE — TELEPHONE ENCOUNTER
Called pt regarding result of her recent screening mammogram at [facility] and the need for diagnostic mammogram and ultrasound. Orders placed and will route message to scheduling to arrange for these to be done. Pt already scheduled for follow up with Dr. Anderson on 2/16. Pt v/u and denies any questions re: this.

## 2021-02-09 ENCOUNTER — APPOINTMENT (OUTPATIENT)
Dept: WOMENS IMAGING | Facility: HOSPITAL | Age: 56
End: 2021-02-09

## 2021-02-09 PROCEDURE — 76641 ULTRASOUND BREAST COMPLETE: CPT | Performed by: RADIOLOGY

## 2021-02-09 PROCEDURE — 77065 DX MAMMO INCL CAD UNI: CPT | Performed by: RADIOLOGY

## 2021-02-09 PROCEDURE — G0279 TOMOSYNTHESIS, MAMMO: HCPCS | Performed by: RADIOLOGY

## 2021-02-09 PROCEDURE — 77061 BREAST TOMOSYNTHESIS UNI: CPT | Performed by: RADIOLOGY

## 2021-02-16 ENCOUNTER — OFFICE VISIT (OUTPATIENT)
Dept: ONCOLOGY | Facility: CLINIC | Age: 56
End: 2021-02-16

## 2021-02-16 ENCOUNTER — LAB (OUTPATIENT)
Dept: LAB | Facility: HOSPITAL | Age: 56
End: 2021-02-16

## 2021-02-16 VITALS
OXYGEN SATURATION: 98 % | TEMPERATURE: 98 F | HEIGHT: 66 IN | SYSTOLIC BLOOD PRESSURE: 146 MMHG | BODY MASS INDEX: 36.35 KG/M2 | DIASTOLIC BLOOD PRESSURE: 86 MMHG | RESPIRATION RATE: 16 BRPM | HEART RATE: 84 BPM | WEIGHT: 226.2 LBS

## 2021-02-16 DIAGNOSIS — D05.11 DUCTAL CARCINOMA IN SITU OF RIGHT BREAST: Primary | ICD-10-CM

## 2021-02-16 DIAGNOSIS — D68.52 HETEROZYGOUS FOR PROTHROMBIN G20210A MUTATION (HCC): ICD-10-CM

## 2021-02-16 LAB
ALBUMIN SERPL-MCNC: 4.4 G/DL (ref 3.5–5.2)
ALBUMIN/GLOB SERPL: 1.3 G/DL (ref 1.1–2.4)
ALP SERPL-CCNC: 62 U/L (ref 38–116)
ALT SERPL W P-5'-P-CCNC: 14 U/L (ref 0–33)
ANION GAP SERPL CALCULATED.3IONS-SCNC: 10.4 MMOL/L (ref 5–15)
AST SERPL-CCNC: 17 U/L (ref 0–32)
BASOPHILS # BLD AUTO: 0.04 10*3/MM3 (ref 0–0.2)
BASOPHILS NFR BLD AUTO: 0.5 % (ref 0–1.5)
BILIRUB SERPL-MCNC: 0.4 MG/DL (ref 0.2–1.2)
BUN SERPL-MCNC: 17 MG/DL (ref 6–20)
BUN/CREAT SERPL: 17.7 (ref 7.3–30)
CALCIUM SPEC-SCNC: 10.2 MG/DL (ref 8.5–10.2)
CHLORIDE SERPL-SCNC: 103 MMOL/L (ref 98–107)
CO2 SERPL-SCNC: 27.6 MMOL/L (ref 22–29)
CREAT SERPL-MCNC: 0.96 MG/DL (ref 0.6–1.1)
DEPRECATED RDW RBC AUTO: 42 FL (ref 37–54)
EOSINOPHIL # BLD AUTO: 0.11 10*3/MM3 (ref 0–0.4)
EOSINOPHIL NFR BLD AUTO: 1.4 % (ref 0.3–6.2)
ERYTHROCYTE [DISTWIDTH] IN BLOOD BY AUTOMATED COUNT: 13 % (ref 12.3–15.4)
GFR SERPL CREATININE-BSD FRML MDRD: 60 ML/MIN/1.73
GLOBULIN UR ELPH-MCNC: 3.5 GM/DL (ref 1.8–3.5)
GLUCOSE SERPL-MCNC: 100 MG/DL (ref 74–124)
HCT VFR BLD AUTO: 42.1 % (ref 34–46.6)
HGB BLD-MCNC: 13.8 G/DL (ref 12–15.9)
IMM GRANULOCYTES # BLD AUTO: 0.02 10*3/MM3 (ref 0–0.05)
IMM GRANULOCYTES NFR BLD AUTO: 0.3 % (ref 0–0.5)
LYMPHOCYTES # BLD AUTO: 2.88 10*3/MM3 (ref 0.7–3.1)
LYMPHOCYTES NFR BLD AUTO: 36.6 % (ref 19.6–45.3)
MCH RBC QN AUTO: 28.9 PG (ref 26.6–33)
MCHC RBC AUTO-ENTMCNC: 32.8 G/DL (ref 31.5–35.7)
MCV RBC AUTO: 88.1 FL (ref 79–97)
MONOCYTES # BLD AUTO: 0.53 10*3/MM3 (ref 0.1–0.9)
MONOCYTES NFR BLD AUTO: 6.7 % (ref 5–12)
NEUTROPHILS NFR BLD AUTO: 4.28 10*3/MM3 (ref 1.7–7)
NEUTROPHILS NFR BLD AUTO: 54.5 % (ref 42.7–76)
NRBC BLD AUTO-RTO: 0 /100 WBC (ref 0–0.2)
PLATELET # BLD AUTO: 266 10*3/MM3 (ref 140–450)
PMV BLD AUTO: 9.7 FL (ref 6–12)
POTASSIUM SERPL-SCNC: 4.2 MMOL/L (ref 3.5–4.7)
PROT SERPL-MCNC: 7.9 G/DL (ref 6.3–8)
RBC # BLD AUTO: 4.78 10*6/MM3 (ref 3.77–5.28)
SODIUM SERPL-SCNC: 141 MMOL/L (ref 134–145)
WBC # BLD AUTO: 7.86 10*3/MM3 (ref 3.4–10.8)

## 2021-02-16 PROCEDURE — 36415 COLL VENOUS BLD VENIPUNCTURE: CPT

## 2021-02-16 PROCEDURE — 80053 COMPREHEN METABOLIC PANEL: CPT

## 2021-02-16 PROCEDURE — 99213 OFFICE O/P EST LOW 20 MIN: CPT | Performed by: INTERNAL MEDICINE

## 2021-02-16 PROCEDURE — 85025 COMPLETE CBC W/AUTO DIFF WBC: CPT

## 2021-02-16 NOTE — PROGRESS NOTES
Subjective:     Reason for follow up:   1. 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 She had her MRI breasts in July 2018 and they were benign but insurance denied repeat MRIs in July 2019.    She had a mammogram 2 weeks ago which was not normal and had additional diagnostic imaging and ultrasounds that are probably benign but repeat imaging in 6 months is again recommended and I think we should be able to get another MRI because she has heterogeneously dense breasts and prior DCIS with no prevention or radiation     Her DEXA scan is normal but she still not motivated to take any prevention with aromatase inhibitor and I think we will hold off based on this    She has not had a period for 3 years     she's feeling well and trying to exercise and eat a healthy diet. No new breast concerns.  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)   • Dermatofibroma   • Impaired glucose tolerance   • Insomnia   • Malaise and fatigue   • Vitamin D deficiency     Social History   Social History     Socioeconomic History   • Marital status:      Spouse name: Yelitza   • Number of children: 3   • Years of education: High school   • Highest education level: Not on file   Occupational History   • Occupation:      Comment: [employer]   Tobacco Use   • Smoking status: Never Smoker   •  "Smokeless tobacco: Never Used   Substance and Sexual Activity   • Alcohol use: No   • Drug use: No   • Sexual activity: Defer     Partners: Male     Comment:       Family History  Family History   Problem Relation Age of Onset   • Clotting disorder Father    • Heart failure Father    • Heart attack Father    • Heart disease Father    • Alcohol abuse Brother    • Prostate cancer Brother 51   • Hypertension Brother    • Heart attack Paternal Grandmother    • Heart failure Paternal Grandmother    • Heart disease Paternal Grandmother    • Hypertension Paternal Grandmother    • Diabetes Maternal Uncle    • Heart disease Maternal Grandmother    • Hypertension Maternal Grandmother    • Heart disease Maternal Grandfather    • Diabetes Maternal Grandfather    • Heart disease Paternal Grandfather    • Skin cancer Brother 58        Skin/neck cancer   • Hypertension Brother    • Thrombophilia Other      Allergies  Allergies   Allergen Reactions   • Codeine Nausea And Vomiting       Medications: The current medication list was reviewed in the EMR.    Review of Systems  Review of Systems   Constitutional: Negative.  Negative for chills, fatigue and fever.   Respiratory: Negative.  Negative for cough, chest tightness and shortness of breath.    Cardiovascular: Negative.  Negative for chest pain.   Gastrointestinal: Negative.  Negative for abdominal pain, constipation, diarrhea, nausea and vomiting.   Genitourinary: Negative for difficulty urinating.   Musculoskeletal: Negative for arthralgias, joint swelling and myalgias.   Neurological: Negative for dizziness and headaches.   Psychiatric/Behavioral: The patient is not nervous/anxious.        Objective   Objective:     Vitals:    02/16/21 1124   BP: 146/86   Pulse: 84   Resp: 16   Temp: 98 °F (36.7 °C)   TempSrc: Skin   SpO2: 98%   Weight: 103 kg (226 lb 3.2 oz)   Height: 167 cm (65.75\")   PainSc: 0-No pain     Current Status 2/16/2021   ECOG score 0     GENERAL:  " Well-developed, well-nourished female in no acute distress. Vital signs reviewed.   SKIN:  Warm, dry without rashes, purpura or petechiae.  EYES:  Pupils equal, round and reactive to light.  EOMs intact.  Conjunctivae normal.  HEAD:  Normocephalic.  EARS/NOSE/MOUTH/THROAT:  Hearing intact. Septum midline.  No excoriations or nasal discharge. Lips normal.   NECK:  Supple with good range of motion; no thyromegaly or masses  LYMPHATICS:  No cervical, supraclavicular, axillary adenopathy.  RESP:  Lungs clear to percussion and auscultation. Good airflow. Normal respiratory effort.  No abdomen  CHEST: Breast exam- Yes, describe: Bilateral breast exam performed allergies detected bilaterally   cARDIAC:  Regular rate and rhythm without murmurs, rubs or gallops. Normal S1,S2. No edema  GI:  Soft, nontender, normal bowel sounds  MSK:  No clubbing or cyanosis, No joint swelling noted in hands  PSYCHIATRIC:  Normal affect and mood.  Alert and Oriented x 3.   I have reexamined the patient and the results are consistent with the previously documented exam. Mitchell Anderson MD        Labs and Imaging  Lab Results   Component Value Date    WBC 7.86 02/16/2021    HGB 13.8 02/16/2021    HCT 42.1 02/16/2021    MCV 88.1 02/16/2021     02/16/2021     * I reviewed scans myself and concur with the below dictation.      SCANNED - MAMMO           Assessment/Plan     Assessment:   1. 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 I also did not think that AI with ovarian suppression is in her best interest since this is DCIS and not invasive. Her labs 7/2018 are in the menopausal range, but  her last period was just a few months ago, thus I'd prefer to wait to start any AI until next February. I discussed with patient today the possibility of AI for risk reduction in 6 months, and I don't think she's particularly interested.    * Plan mammogram alternating with MRI   * MRI July reviewed and benign, plan mammo December              *Insurance not covering MRIs              *Trial of Arimidex planned if bone density shows no osteoporosis  2.  Heterozygous prothrombin gene mutation and mildly low protein C (63%) with family history but no personal history of thrombosis, although she did have fetal demise at almost term. She is on a baby aspirin and has been encouraged to avoid any supplemental estrogens.     3. Uterine fibroids. Follows with Dr. Rosa.     Plan:     1.  MRI breasts because of abnormal mammogram dense breasts and previous to DCIS in the left breast without radiation and lack of prevention    2.  Continue annual follow-up at this point patient declines aromatase inhibitor and because of her prothrombin gene mutation I am reluctant to give her tamoxifen although low-dose tamoxifen may be low risk I do not have enough data to use this safely in her

## 2021-08-09 ENCOUNTER — APPOINTMENT (OUTPATIENT)
Dept: WOMENS IMAGING | Facility: HOSPITAL | Age: 56
End: 2021-08-09

## 2021-08-09 ENCOUNTER — HOSPITAL ENCOUNTER (OUTPATIENT)
Dept: MRI IMAGING | Facility: HOSPITAL | Age: 56
Discharge: HOME | End: 2021-08-09
Admitting: INTERNAL MEDICINE

## 2021-08-09 DIAGNOSIS — D68.52 HETEROZYGOUS FOR PROTHROMBIN G20210A MUTATION (HCC): ICD-10-CM

## 2021-08-09 DIAGNOSIS — D05.11 DUCTAL CARCINOMA IN SITU OF RIGHT BREAST: ICD-10-CM

## 2021-08-09 PROCEDURE — G0279 TOMOSYNTHESIS, MAMMO: HCPCS | Performed by: RADIOLOGY

## 2021-08-09 PROCEDURE — A9577 INJ MULTIHANCE: HCPCS | Performed by: INTERNAL MEDICINE

## 2021-08-09 PROCEDURE — 77065 DX MAMMO INCL CAD UNI: CPT | Performed by: RADIOLOGY

## 2021-08-09 PROCEDURE — 76641 ULTRASOUND BREAST COMPLETE: CPT | Performed by: RADIOLOGY

## 2021-08-09 PROCEDURE — 77061 BREAST TOMOSYNTHESIS UNI: CPT | Performed by: RADIOLOGY

## 2021-08-09 PROCEDURE — 77049 MRI BREAST C-+ W/CAD BI: CPT

## 2021-08-09 PROCEDURE — 0 GADOBENATE DIMEGLUMINE 529 MG/ML SOLUTION: Performed by: INTERNAL MEDICINE

## 2021-08-09 RX ADMIN — GADOBENATE DIMEGLUMINE 20 ML: 529 INJECTION, SOLUTION INTRAVENOUS at 12:26

## 2021-08-11 DIAGNOSIS — R92.8 ABNORMAL FINDING ON BREAST IMAGING: ICD-10-CM

## 2021-08-11 DIAGNOSIS — D05.11 DUCTAL CARCINOMA IN SITU OF RIGHT BREAST: Primary | ICD-10-CM

## 2021-08-19 ENCOUNTER — HOSPITAL ENCOUNTER (OUTPATIENT)
Dept: MRI IMAGING | Facility: HOSPITAL | Age: 56
Discharge: HOME | End: 2021-08-19

## 2021-08-19 ENCOUNTER — HOSPITAL ENCOUNTER (OUTPATIENT)
Dept: MAMMOGRAPHY | Facility: HOSPITAL | Age: 56
Discharge: HOME | End: 2021-08-19

## 2021-08-19 VITALS
DIASTOLIC BLOOD PRESSURE: 81 MMHG | TEMPERATURE: 97.7 F | RESPIRATION RATE: 16 BRPM | HEIGHT: 66 IN | HEART RATE: 76 BPM | OXYGEN SATURATION: 100 % | WEIGHT: 225 LBS | BODY MASS INDEX: 36.16 KG/M2 | SYSTOLIC BLOOD PRESSURE: 117 MMHG

## 2021-08-19 DIAGNOSIS — R92.8 ABNORMAL FINDING ON BREAST IMAGING: ICD-10-CM

## 2021-08-19 DIAGNOSIS — D05.11 DUCTAL CARCINOMA IN SITU OF RIGHT BREAST: ICD-10-CM

## 2021-08-19 LAB — CREAT BLDA-MCNC: 0.9 MG/DL (ref 0.6–1.3)

## 2021-08-19 PROCEDURE — 77065 DX MAMMO INCL CAD UNI: CPT

## 2021-08-19 PROCEDURE — 25010000003 LIDOCAINE 1 % SOLUTION: Performed by: INTERNAL MEDICINE

## 2021-08-19 PROCEDURE — 0 GADOBENATE DIMEGLUMINE 529 MG/ML SOLUTION: Performed by: INTERNAL MEDICINE

## 2021-08-19 PROCEDURE — 88305 TISSUE EXAM BY PATHOLOGIST: CPT | Performed by: INTERNAL MEDICINE

## 2021-08-19 PROCEDURE — 82565 ASSAY OF CREATININE: CPT

## 2021-08-19 PROCEDURE — A9577 INJ MULTIHANCE: HCPCS | Performed by: INTERNAL MEDICINE

## 2021-08-19 PROCEDURE — A4648 IMPLANTABLE TISSUE MARKER: HCPCS

## 2021-08-19 RX ORDER — DIAZEPAM 5 MG/1
5 TABLET ORAL ONCE AS NEEDED
Status: DISCONTINUED | OUTPATIENT
Start: 2021-08-19 | End: 2021-08-20 | Stop reason: HOSPADM

## 2021-08-19 RX ORDER — DIAZEPAM 5 MG/1
5 TABLET ORAL ONCE AS NEEDED
Status: CANCELLED | OUTPATIENT
Start: 2021-08-19

## 2021-08-19 RX ORDER — LIDOCAINE HYDROCHLORIDE 10 MG/ML
1 INJECTION, SOLUTION INFILTRATION; PERINEURAL ONCE
Status: COMPLETED | OUTPATIENT
Start: 2021-08-19 | End: 2021-08-19

## 2021-08-19 RX ADMIN — LIDOCAINE HYDROCHLORIDE 13 ML: 10; .005 INJECTION, SOLUTION EPIDURAL; INFILTRATION; INTRACAUDAL; PERINEURAL at 08:35

## 2021-08-19 RX ADMIN — GADOBENATE DIMEGLUMINE 20 ML: 529 INJECTION, SOLUTION INTRAVENOUS at 08:19

## 2021-08-19 RX ADMIN — LIDOCAINE HYDROCHLORIDE 1 ML: 10 INJECTION, SOLUTION INFILTRATION; PERINEURAL at 08:34

## 2021-08-19 NOTE — H&P
Name: Connie Green ADMIT: 2021   : 1965  PCP: Adriane Colindres MD (Inactive)    MRN: [id number] LOS: 0 days   AGE/SEX: 55 y.o. female  ROOM: Room/bed info not found       Chief complaint right breast suspicious enhancement    Present Illness or Internal History:  Patient is a 55 y.o. female presents with right breast suspicious enhancement.     Past Surgical History:  Past Surgical History:   Procedure Laterality Date   • BREAST BIOPSY Right 2017    Malignant   • BREAST LUMPECTOMY Right 3/1/2017    Procedure: RIGHT BREAST LUMPECTOMY WITH NEEDLE LOCALIZATION;  Surgeon: Neal Sanchez MD;  Location[facility];  Service:    • ENDOMETRIAL ABLATION     • LAPAROSCOPIC TUBAL LIGATION     • TUBAL ABDOMINAL LIGATION         Past Medical History:  Past Medical History:   Diagnosis Date   • Breast cancer (CMS/HCC) 2017    Right breast   • Cancer (CMS/HCC)     right breast cancer   • Prothrombin gene mutation (CMS/HCC)        Home Medications:  (Not in a hospital admission)      Allergies:  Codeine    Family History:  Family History   Problem Relation Age of Onset   • Clotting disorder Father    • Heart failure Father    • Heart attack Father    • Heart disease Father    • Alcohol abuse Brother    • Prostate cancer Brother 51   • Hypertension Brother    • Heart attack Paternal Grandmother    • Heart failure Paternal Grandmother    • Heart disease Paternal Grandmother    • Hypertension Paternal Grandmother    • Diabetes Maternal Uncle    • Heart disease Maternal Grandmother    • Hypertension Maternal Grandmother    • Heart disease Maternal Grandfather    • Diabetes Maternal Grandfather    • Heart disease Paternal Grandfather    • Skin cancer Brother 58        Skin/neck cancer   • Hypertension Brother    • Thrombophilia Other        Social History:  Social History     Tobacco Use   • Smoking status: Never Smoker   • Smokeless tobacco: Never Used   Substance Use Topics   • Alcohol  use: No   • Drug use: No        Objective     Physical Exam:    No exam performed today,    Vital Signs  Temp:  [97.7 °F (36.5 °C)] 97.7 °F (36.5 °C)  Heart Rate:  [88] 88  Resp:  [18] 18  BP: (128)/(87) 128/87    Anticipated Surgical Procedure:  MRI guided right breast biopsy with clip placement    The risks, benefits and alternatives of this procedure have been discussed with the patient or responsible party: Yes        Adonay Rider Jr., MD  08/19/21  07:46 EDT

## 2021-08-19 NOTE — NURSING NOTE
Biopsy site to right outer breast clear with Dermabond dry and intact. No firmness or swelling noted at or around biopsy site. Denies pain. Ice pack with protective covering applied to biopsy site. Discharge instructions discussed with understanding voiced by patient. Copies provided to patient. No distress noted. To home via private vehicle.

## 2021-08-20 LAB
CYTO UR: NORMAL
LAB AP CASE REPORT: NORMAL
PATH REPORT.FINAL DX SPEC: NORMAL
PATH REPORT.GROSS SPEC: NORMAL

## 2021-10-07 ENCOUNTER — TELEPHONE (OUTPATIENT)
Dept: ONCOLOGY | Facility: CLINIC | Age: 56
End: 2021-10-07

## 2021-10-07 NOTE — TELEPHONE ENCOUNTER
Caller: EVAN    Relationship: PATIENT     Best call back number: [phone]    What is the best time to reach you: ANYTIME    What was the call regarding: SHE'D JUST LIKE TO ASK DR JACKSON IF SHE NEEDS THE BREAST ULTRASOUND. SHE SAYS SHE'S RATHER JUST DO THE ONE MAMMOGRAM AT [facility].    Do you require a callback: YES

## 2021-10-13 NOTE — TELEPHONE ENCOUNTER
Returned pt's call re: her question about her mammogram. Dr. Anderson said it will be fine to have her mammogram at [facility]. Pt v/u.

## 2021-10-13 NOTE — PROGRESS NOTES
"Chief Complaint  Annual Exam    Subjective          Connie Green presents to [facility]  History of Present Illness  Annual physical    H/o protein c deficiemcy, impaired glucose tolerance, DCIS right breast, vitamin d def.  Meds: asa, vitamin d3, dha, krill oil, melatonin, mvi    Mammogram 8/2021  Did cologuard per GYN, thinks last year or the year before    Is fasting for labs today    Denies any new problems or concerns  Is working on decreasing her hemoglobin A1C, no FH of DM, did have some gestational diabetes with 2 pregnancies  Is walking for exercise, walks regularly most evenings    Review of Systems   Constitutional: Negative.    HENT: Negative.    Respiratory: Negative.    Cardiovascular: Negative.    Gastrointestinal: Negative.    Genitourinary: Negative.    Musculoskeletal: Negative.    Neurological: Negative.    Psychiatric/Behavioral: Positive for sleep disturbance.        Sleep is improved with melatonin       Objective   Vital Signs:   /84 (BP Location: Right arm, Cuff Size: Large Adult)   Pulse 81   Temp 96.9 °F (36.1 °C) (Infrared)   Resp 16   Ht 165.1 cm (65\")   Wt 103 kg (226 lb)   SpO2 98%   BMI 37.61 kg/m²     Physical Exam  Vitals reviewed.   Constitutional:       Appearance: Normal appearance. She is well-developed and well-groomed.   HENT:      Head: Normocephalic.      Right Ear: Tympanic membrane normal.      Left Ear: Tympanic membrane normal.      Nose: Nose normal.      Mouth/Throat:      Mouth: Mucous membranes are moist.      Pharynx: Oropharynx is clear.   Neck:      Thyroid: No thyromegaly.      Vascular: Carotid bruit present.   Cardiovascular:      Rate and Rhythm: Normal rate and regular rhythm.      Pulses: Normal pulses.      Heart sounds: Normal heart sounds.   Pulmonary:      Effort: Pulmonary effort is normal.      Breath sounds: Normal breath sounds.   Abdominal:      General: Bowel sounds are normal.      Palpations: " Abdomen is soft.      Tenderness: There is no abdominal tenderness. There is no guarding.   Musculoskeletal:         General: Normal range of motion.      Cervical back: Normal range of motion and neck supple.      Right lower leg: No edema.      Left lower leg: No edema.   Lymphadenopathy:      Cervical: No cervical adenopathy.   Skin:     General: Skin is warm.      Capillary Refill: Capillary refill takes less than 2 seconds.   Neurological:      Mental Status: She is alert and oriented to person, place, and time.   Psychiatric:         Mood and Affect: Mood normal.        Result Review :                 Assessment and Plan    Diagnoses and all orders for this visit:    1. Preventative health care (Primary)  -     Comprehensive metabolic panel; Future  -     CBC w AUTO Differential; Future  -     TSH; Future  -     Hemoglobin A1c; Future    2. Vitamin D deficiency  -     Vitamin D 25 hydroxy; Future    3. Screening, lipid  -     Lipid panel; Future        Follow Up   Return in about 1 year (around 10/14/2022) for Annual physical.  Patient was given instructions and counseling regarding her condition or for health maintenance advice. Please see specific information pulled into the AVS if appropriate.

## 2021-10-14 ENCOUNTER — OFFICE VISIT (OUTPATIENT)
Dept: FAMILY MEDICINE CLINIC | Facility: CLINIC | Age: 56
End: 2021-10-14

## 2021-10-14 ENCOUNTER — LAB (OUTPATIENT)
Dept: FAMILY MEDICINE CLINIC | Facility: CLINIC | Age: 56
End: 2021-10-14

## 2021-10-14 VITALS
WEIGHT: 226 LBS | BODY MASS INDEX: 37.65 KG/M2 | TEMPERATURE: 96.9 F | SYSTOLIC BLOOD PRESSURE: 110 MMHG | HEIGHT: 65 IN | HEART RATE: 81 BPM | DIASTOLIC BLOOD PRESSURE: 84 MMHG | OXYGEN SATURATION: 98 % | RESPIRATION RATE: 16 BRPM

## 2021-10-14 DIAGNOSIS — Z13.220 SCREENING, LIPID: ICD-10-CM

## 2021-10-14 DIAGNOSIS — E55.9 VITAMIN D DEFICIENCY: ICD-10-CM

## 2021-10-14 DIAGNOSIS — Z00.00 PREVENTATIVE HEALTH CARE: ICD-10-CM

## 2021-10-14 DIAGNOSIS — Z00.00 PREVENTATIVE HEALTH CARE: Primary | ICD-10-CM

## 2021-10-14 LAB
25(OH)D3 SERPL-MCNC: 60.8 NG/ML
ALBUMIN SERPL-MCNC: 4.6 G/DL (ref 3.5–5.2)
ALBUMIN/GLOB SERPL: 1.4 G/DL
ALP SERPL-CCNC: 62 U/L (ref 39–117)
ALT SERPL W P-5'-P-CCNC: 17 U/L (ref 1–33)
ANION GAP SERPL CALCULATED.3IONS-SCNC: 10.8 MMOL/L (ref 5–15)
AST SERPL-CCNC: 17 U/L (ref 1–32)
BASOPHILS # BLD AUTO: 0.02 10*3/MM3 (ref 0–0.2)
BASOPHILS NFR BLD AUTO: 0.3 % (ref 0–1.5)
BILIRUB SERPL-MCNC: 0.4 MG/DL (ref 0–1.2)
BUN SERPL-MCNC: 14 MG/DL (ref 6–20)
BUN/CREAT SERPL: 14.1 (ref 7–25)
CALCIUM SPEC-SCNC: 9.5 MG/DL (ref 8.6–10.5)
CHLORIDE SERPL-SCNC: 102 MMOL/L (ref 98–107)
CHOLEST SERPL-MCNC: 179 MG/DL (ref 0–200)
CO2 SERPL-SCNC: 27.2 MMOL/L (ref 22–29)
CREAT SERPL-MCNC: 0.99 MG/DL (ref 0.57–1)
DEPRECATED RDW RBC AUTO: 40.7 FL (ref 37–54)
EOSINOPHIL # BLD AUTO: 0.09 10*3/MM3 (ref 0–0.4)
EOSINOPHIL NFR BLD AUTO: 1.6 % (ref 0.3–6.2)
ERYTHROCYTE [DISTWIDTH] IN BLOOD BY AUTOMATED COUNT: 12.8 % (ref 12.3–15.4)
GFR SERPL CREATININE-BSD FRML MDRD: 58 ML/MIN/1.73
GLOBULIN UR ELPH-MCNC: 3.3 GM/DL
GLUCOSE SERPL-MCNC: 104 MG/DL (ref 65–99)
HBA1C MFR BLD: 5.8 % (ref 3.5–5.6)
HCT VFR BLD AUTO: 42.6 % (ref 34–46.6)
HDLC SERPL-MCNC: 60 MG/DL (ref 40–60)
HGB BLD-MCNC: 13.9 G/DL (ref 12–15.9)
IMM GRANULOCYTES # BLD AUTO: 0.01 10*3/MM3 (ref 0–0.05)
IMM GRANULOCYTES NFR BLD AUTO: 0.2 % (ref 0–0.5)
LDLC SERPL CALC-MCNC: 106 MG/DL (ref 0–100)
LDLC/HDLC SERPL: 1.75 {RATIO}
LYMPHOCYTES # BLD AUTO: 2.46 10*3/MM3 (ref 0.7–3.1)
LYMPHOCYTES NFR BLD AUTO: 42.6 % (ref 19.6–45.3)
MCH RBC QN AUTO: 28.8 PG (ref 26.6–33)
MCHC RBC AUTO-ENTMCNC: 32.6 G/DL (ref 31.5–35.7)
MCV RBC AUTO: 88.2 FL (ref 79–97)
MONOCYTES # BLD AUTO: 0.49 10*3/MM3 (ref 0.1–0.9)
MONOCYTES NFR BLD AUTO: 8.5 % (ref 5–12)
NEUTROPHILS NFR BLD AUTO: 2.71 10*3/MM3 (ref 1.7–7)
NEUTROPHILS NFR BLD AUTO: 46.8 % (ref 42.7–76)
NRBC BLD AUTO-RTO: 0 /100 WBC (ref 0–0.2)
PLATELET # BLD AUTO: 280 10*3/MM3 (ref 140–450)
PMV BLD AUTO: 10.1 FL (ref 6–12)
POTASSIUM SERPL-SCNC: 4.1 MMOL/L (ref 3.5–5.2)
PROT SERPL-MCNC: 7.9 G/DL (ref 6–8.5)
RBC # BLD AUTO: 4.83 10*6/MM3 (ref 3.77–5.28)
SODIUM SERPL-SCNC: 140 MMOL/L (ref 136–145)
TRIGL SERPL-MCNC: 71 MG/DL (ref 0–150)
TSH SERPL DL<=0.05 MIU/L-ACNC: 2.02 UIU/ML (ref 0.27–4.2)
VLDLC SERPL-MCNC: 13 MG/DL (ref 5–40)
WBC # BLD AUTO: 5.78 10*3/MM3 (ref 3.4–10.8)

## 2021-10-14 PROCEDURE — 84443 ASSAY THYROID STIM HORMONE: CPT | Performed by: NURSE PRACTITIONER

## 2021-10-14 PROCEDURE — 36415 COLL VENOUS BLD VENIPUNCTURE: CPT

## 2021-10-14 PROCEDURE — 82306 VITAMIN D 25 HYDROXY: CPT | Performed by: NURSE PRACTITIONER

## 2021-10-14 PROCEDURE — 85025 COMPLETE CBC W/AUTO DIFF WBC: CPT | Performed by: NURSE PRACTITIONER

## 2021-10-14 PROCEDURE — 99396 PREV VISIT EST AGE 40-64: CPT | Performed by: NURSE PRACTITIONER

## 2021-10-14 PROCEDURE — 80053 COMPREHEN METABOLIC PANEL: CPT | Performed by: NURSE PRACTITIONER

## 2021-10-14 PROCEDURE — 83036 HEMOGLOBIN GLYCOSYLATED A1C: CPT | Performed by: NURSE PRACTITIONER

## 2021-10-14 PROCEDURE — 80061 LIPID PANEL: CPT | Performed by: NURSE PRACTITIONER

## 2021-10-14 RX ORDER — AMPICILLIN TRIHYDRATE 250 MG
1200 CAPSULE ORAL
COMMUNITY
Start: 2021-04-01

## 2021-10-14 RX ORDER — PHENOL 1.4 %
AEROSOL, SPRAY (ML) MUCOUS MEMBRANE
COMMUNITY
Start: 2021-03-01 | End: 2022-04-06

## 2021-10-14 RX ORDER — OMEGA-3S/DHA/EPA/FISH OIL/D3 300MG-1000
CAPSULE ORAL
COMMUNITY
Start: 2021-04-01

## 2021-10-14 NOTE — PATIENT INSTRUCTIONS
Health Maintenance, Female  Adopting a healthy lifestyle and getting preventive care are important in promoting health and wellness. Ask your health care provider about:  · The right schedule for you to have regular tests and exams.  · Things you can do on your own to prevent diseases and keep yourself healthy.  What should I know about diet, weight, and exercise?  Eat a healthy diet    · Eat a diet that includes plenty of vegetables, fruits, low-fat dairy products, and lean protein.  · Do not eat a lot of foods that are high in solid fats, added sugars, or sodium.    Maintain a healthy weight  Body mass index (BMI) is used to identify weight problems. It estimates body fat based on height and weight. Your health care provider can help determine your BMI and help you achieve or maintain a healthy weight.  Get regular exercise  Get regular exercise. This is one of the most important things you can do for your health. Most adults should:  · Exercise for at least 150 minutes each week. The exercise should increase your heart rate and make you sweat (moderate-intensity exercise).  · Do strengthening exercises at least twice a week. This is in addition to the moderate-intensity exercise.  · Spend less time sitting. Even light physical activity can be beneficial.  Watch cholesterol and blood lipids  Have your blood tested for lipids and cholesterol at 20 years of age, then have this test every 5 years.  Have your cholesterol levels checked more often if:  · Your lipid or cholesterol levels are high.  · You are older than 40 years of age.  · You are at high risk for heart disease.  What should I know about cancer screening?  Depending on your health history and family history, you may need to have cancer screening at various ages. This may include screening for:  · Breast cancer.  · Cervical cancer.  · Colorectal cancer.  · Skin cancer.  · Lung cancer.  What should I know about heart disease, diabetes, and high blood  pressure?  Blood pressure and heart disease  · High blood pressure causes heart disease and increases the risk of stroke. This is more likely to develop in people who have high blood pressure readings, are of  descent, or are overweight.  · Have your blood pressure checked:  ? Every 3-5 years if you are 18-39 years of age.  ? Every year if you are 40 years old or older.  Diabetes  Have regular diabetes screenings. This checks your fasting blood sugar level. Have the screening done:  · Once every three years after age 40 if you are at a normal weight and have a low risk for diabetes.  · More often and at a younger age if you are overweight or have a high risk for diabetes.  What should I know about preventing infection?  Hepatitis B  If you have a higher risk for hepatitis B, you should be screened for this virus. Talk with your health care provider to find out if you are at risk for hepatitis B infection.  Hepatitis C  Testing is recommended for:  · Everyone born from 1945 through 1965.  · Anyone with known risk factors for hepatitis C.  Sexually transmitted infections (STIs)  · Get screened for STIs, including gonorrhea and chlamydia, if:  ? You are sexually active and are younger than 24 years of age.  ? You are older than 24 years of age and your health care provider tells you that you are at risk for this type of infection.  ? Your sexual activity has changed since you were last screened, and you are at increased risk for chlamydia or gonorrhea. Ask your health care provider if you are at risk.  · Ask your health care provider about whether you are at high risk for HIV. Your health care provider may recommend a prescription medicine to help prevent HIV infection. If you choose to take medicine to prevent HIV, you should first get tested for HIV. You should then be tested every 3 months for as long as you are taking the medicine.  Pregnancy  · If you are about to stop having your period (premenopausal) and  you may become pregnant, seek counseling before you get pregnant.  · Take 400 to 800 micrograms (mcg) of folic acid every day if you become pregnant.  · Ask for birth control (contraception) if you want to prevent pregnancy.  Osteoporosis and menopause  Osteoporosis is a disease in which the bones lose minerals and strength with aging. This can result in bone fractures. If you are 65 years old or older, or if you are at risk for osteoporosis and fractures, ask your health care provider if you should:  · Be screened for bone loss.  · Take a calcium or vitamin D supplement to lower your risk of fractures.  · Be given hormone replacement therapy (HRT) to treat symptoms of menopause.  Follow these instructions at home:  Lifestyle  · Do not use any products that contain nicotine or tobacco, such as cigarettes, e-cigarettes, and chewing tobacco. If you need help quitting, ask your health care provider.  · Do not use street drugs.  · Do not share needles.  · Ask your health care provider for help if you need support or information about quitting drugs.  Alcohol use  · Do not drink alcohol if:  ? Your health care provider tells you not to drink.  ? You are pregnant, may be pregnant, or are planning to become pregnant.  · If you drink alcohol:  ? Limit how much you use to 0-1 drink a day.  ? Limit intake if you are breastfeeding.  · Be aware of how much alcohol is in your drink. In the [place] one drink equals one 12 oz bottle of beer (355 mL), one 5 oz glass of wine (148 mL), or one 1½ oz glass of hard liquor (44 mL).  General instructions  · Schedule regular health, dental, and eye exams.  · Stay current with your vaccines.  · Tell your health care provider if:  ? You often feel depressed.  ? You have ever been abused or do not feel safe at home.  Summary  · Adopting a healthy lifestyle and getting preventive care are important in promoting health and wellness.  · Follow your health care provider's instructions about healthy  "diet, exercising, and getting tested or screened for diseases.  · Follow your health care provider's instructions on monitoring your cholesterol and blood pressure.  This information is not intended to replace advice given to you by your health care provider. Make sure you discuss any questions you have with your health care provider.  Document Revised: 12/11/2019 Document Reviewed: 12/11/2019  [employer] Patient Education © 2021 [employer]    https://www.uspreventiveservicestaskforce.org/uspstf/recommendation/lung-cancer-screening\">   Cancer Screening for Women  A cancer screening is a test or exam that checks for cancer. Your health care provider will recommend specific cancer screenings based on your age, medical history (including risk factors), and family history of cancer. Work with your health care provider to create a cancer screening schedule that protects your health.  Who should have screening?  All women should be considered for screening of certain cancers, including breast cancer, cervical cancer, colorectal cancer, and skin cancer. Your health care provider may recommend screenings for other types of cancer if:  · You had cancer before.  · You have a family member with cancer.  · You have abnormal genes that could increase the risk of cancer.  · You have risk factors for certain cancers, such as current or past use of tobacco products, or being overweight.  When you should be screened for cancer depends on:  · Your age.  · Your medical history and your family's medical history.  · Certain lifestyle factors, such as smoking or other use of tobacco products.  · Environmental exposure, such as to asbestos.  How is screening done?  Breast cancer  Breast cancer screening is done with a test that takes images of breast tissue (mammogram). Here are some screening guidelines for women at average risk:  · When you are age 40-44, you will be given the choice to start having mammograms.  · When you are age " 45-54, you should have a mammogram every year.  · You may start having mammograms before age 45 if you have risk factors for breast cancer, such as having an immediate family member with breast cancer.  · At age 55 or older, you should have a mammogram every 1-2 years for as long as you are in good health and have a life expectancy of 10 years or longer.  · It is important to know what your breasts look and feel like so you can report any changes to your health care provider.    Cervical cancer  · All women at average risk should be considered for screening for cervical cancer starting no later than age 25 and continuing until age 65. Screening should not begin earlier than age 21. You will have tests every 3-5 years, depending on your results and the type of screening test. Talk with your health care provider about which screening test is right for you and how often you should be screened.  ? Cervical cancer screening is done with an HPV (human papillomavirus) test to identify the virus that causes cervical cancer. To perform the test, a health care provider takes a swab of cells from yourcervix during a pelvic exam. This test may be performed along with a Pap test. This testchecks for abnormalities in the lowest part of the uterus (cervix).  ? If you have had the HPV vaccine, you will still be screened for cervical cancer and follow normal screening recommendations.  You do not need to be screened for cervical cancer if any of the following apply to you:  · You are older than age 65 and you have had normal screening tests in the past 10 years with no serious cervical precancer or cancer in the last 25 years.  · Your cervix and uterus have been removed, and you have never had cervical cancer or abnormal cells that could become cancer (precancerous cells).  Colorectal cancer  All adults at average risk should be considered for screening for colorectal cancer starting at age 50 and continuing until age 75. Your  health care provider may recommend screening at age 45 if you are at higher risk due to family history of colon or rectal cancer or other risk factors. You will have tests every 1-10 years, depending on your results and the type of screening test. Talk with your health care provider about which screening test is right for you and how often you should be screened.  Colorectal cancer screening looks for cancer or for growths called polyps that often form before cancer starts. Tests to look for cancer or polyps include:  · Colonoscopy or flexible sigmoidoscopy. For these procedures, a flexible tube with a small camera is inserted into the rectum.  · CT colonography. This test uses X-rays and a contrast dye to check the colon for polyps. If a polyp is found, you may need to have a colonoscopy so the polyp can be located and removed.  Tests to look for cancer in the stool (feces) include:  · Guaiac-based fecal occult blood test (FOBT). This test can find blood in stool. It can be done at home with a kit.  · Fecal immunochemical test (FIT). This test can find blood in stool. For this test, you will need to collect stool samples at home.  · Stool DNA test. This test looks for blood in stool and any changes in DNA that can lead to colon cancer. For this test, you will need to collect a stool sample at home and send it to a lab.  Endometrial cancer  There is no standard screening test for endometrial cancer, and women at average risk do not routinely need to have this screening. Talk with your health care provider about whether screening is right for you. If it is, this screening is performed through:  · Endometrial tissue biopsy. This tests a sample of tissue taken from the lining of the uterus.  · Vaginal ultrasound.  If you are at increased risk for endometrial cancer, you may need to have these tests more often than normal. You are at increased risk if:  · You have a family history of ovarian, uterine, or colon  cancer.  · You are taking tamoxifen, a medicine used to treat breast cancer.  · You have certain types of colon cancer.  If you have reached menopause, it is especially important to talk with your health care provider about any vaginal bleeding or spotting. Screening for endometrial cancer is not recommended for women who do not have symptoms of the cancer, such as vaginal bleeding.  Lung cancer  Lung cancer screening is done with a CT scan that looks for abnormal changes in the lungs. Discuss lung cancer screening with your health care provider if you are 50-80 years old and if any of the following apply to you:  · You currently smoke.  · You used to smoke heavily.  · You have a smoking history of 1 pack of cigarettes a day for 20 years or 2 packs a day for 10 years.  · You have quit smoking within the past 15 years.  You may need to be screened every year if you smoke heavily or if you used to smoke.  Skin cancer  Skin cancer screening is done by checking the skin for unusual moles or spots and any changes in existing moles. Your health care provider should check your skin for signs of skin cancer at every physical exam. You should check your skin every month and tell your health care provider right away if anything looks unusual. Women with a higher-than-normal risk for skin cancer may want to see a skin specialist (dermatologist) for an annual body check.  What are the benefits of screening?  Cancer screening is done to look for cancer in the very early stages, before it spreads and becomes harder to treat and before you would start to notice symptoms. Finding cancer early improves the chances of successful treatment. It may save your life.  Where to find more information  · American Cancer Society: www.cancer.org  · Centers for Disease Control and Prevention: www.cdc.gov  · National Cancer [place]: www.cancer.gov  · [place] Department of Health and Human Services: www.womenshealth.gov  Contact a health care  provider if:  You have concerns about any signs or symptoms of cancer. These may include:  · Skin problems. You may have:  ? Moles of an unusual shape or color.  ? Changes in existing moles.  ? A sore on your skin that does not heal.  · Tiredness (fatigue) that does not go away.  · Losing weight without trying.  · Blood in your urine or stool.  · Problems with coughing or breathing. These may include:  ? Coughing or trouble breathing that does not go away.  ? Coughing up blood.  · Lumps or other changes in your breasts.  · Vaginal bleeding, spotting, or changes in your periods.  · Frequent pain or cramping in your abdomen.  Summary  · Your health care provider will recommend specific cancer screenings based on your age, medical history, and family history of cancer.  · Work with your health care provider to create a cancer screening schedule that protects your health.  · Finding cancer early improves the chances of successful treatment. It may save your life.  · Contact a health care provider if you have concerns about any signs or symptoms of cancer.  This information is not intended to replace advice given to you by your health care provider. Make sure you discuss any questions you have with your health care provider.  Document Revised: 05/10/2021 Document Reviewed: 11/13/2020  [employer]vier Patient Education © 2021 [employer]

## 2022-03-07 ENCOUNTER — APPOINTMENT (OUTPATIENT)
Dept: WOMENS IMAGING | Facility: HOSPITAL | Age: 57
End: 2022-03-07

## 2022-03-07 PROCEDURE — G0279 TOMOSYNTHESIS, MAMMO: HCPCS | Performed by: RADIOLOGY

## 2022-03-07 PROCEDURE — 77062 BREAST TOMOSYNTHESIS BI: CPT | Performed by: RADIOLOGY

## 2022-03-07 PROCEDURE — 77066 DX MAMMO INCL CAD BI: CPT | Performed by: RADIOLOGY

## 2022-04-06 ENCOUNTER — LAB (OUTPATIENT)
Dept: LAB | Facility: HOSPITAL | Age: 57
End: 2022-04-06

## 2022-04-06 ENCOUNTER — APPOINTMENT (OUTPATIENT)
Dept: LAB | Facility: HOSPITAL | Age: 57
End: 2022-04-06

## 2022-04-06 ENCOUNTER — OFFICE VISIT (OUTPATIENT)
Dept: ONCOLOGY | Facility: CLINIC | Age: 57
End: 2022-04-06

## 2022-04-06 ENCOUNTER — TELEPHONE (OUTPATIENT)
Dept: ONCOLOGY | Facility: CLINIC | Age: 57
End: 2022-04-06

## 2022-04-06 VITALS
TEMPERATURE: 97 F | RESPIRATION RATE: 17 BRPM | WEIGHT: 228.5 LBS | HEART RATE: 88 BPM | BODY MASS INDEX: 38.07 KG/M2 | DIASTOLIC BLOOD PRESSURE: 94 MMHG | SYSTOLIC BLOOD PRESSURE: 148 MMHG | HEIGHT: 65 IN | OXYGEN SATURATION: 99 %

## 2022-04-06 DIAGNOSIS — D05.11 DUCTAL CARCINOMA IN SITU OF RIGHT BREAST: Primary | ICD-10-CM

## 2022-04-06 DIAGNOSIS — R73.02 IMPAIRED GLUCOSE TOLERANCE: ICD-10-CM

## 2022-04-06 LAB
ALBUMIN SERPL-MCNC: 4.7 G/DL (ref 3.5–5.2)
ALBUMIN/GLOB SERPL: 1.4 G/DL (ref 1.1–2.4)
ALP SERPL-CCNC: 64 U/L (ref 38–116)
ALT SERPL W P-5'-P-CCNC: 25 U/L (ref 0–33)
ANION GAP SERPL CALCULATED.3IONS-SCNC: 9.2 MMOL/L (ref 5–15)
AST SERPL-CCNC: 27 U/L (ref 0–32)
BASOPHILS # BLD AUTO: 0.04 10*3/MM3 (ref 0–0.2)
BASOPHILS NFR BLD AUTO: 0.6 % (ref 0–1.5)
BILIRUB SERPL-MCNC: 0.4 MG/DL (ref 0.2–1.2)
BUN SERPL-MCNC: 16 MG/DL (ref 6–20)
BUN/CREAT SERPL: 16 (ref 7.3–30)
CALCIUM SPEC-SCNC: 9.6 MG/DL (ref 8.5–10.2)
CHLORIDE SERPL-SCNC: 103 MMOL/L (ref 98–107)
CO2 SERPL-SCNC: 27.8 MMOL/L (ref 22–29)
CREAT SERPL-MCNC: 1 MG/DL (ref 0.6–1.1)
DEPRECATED RDW RBC AUTO: 42.5 FL (ref 37–54)
EGFRCR SERPLBLD CKD-EPI 2021: 65.8 ML/MIN/1.73
EOSINOPHIL # BLD AUTO: 0.07 10*3/MM3 (ref 0–0.4)
EOSINOPHIL NFR BLD AUTO: 1.1 % (ref 0.3–6.2)
ERYTHROCYTE [DISTWIDTH] IN BLOOD BY AUTOMATED COUNT: 13.2 % (ref 12.3–15.4)
GLOBULIN UR ELPH-MCNC: 3.4 GM/DL (ref 1.8–3.5)
GLUCOSE SERPL-MCNC: 112 MG/DL (ref 74–124)
HBA1C MFR BLD: 6 % (ref 4.8–5.6)
HCT VFR BLD AUTO: 42 % (ref 34–46.6)
HGB BLD-MCNC: 14 G/DL (ref 12–15.9)
IMM GRANULOCYTES # BLD AUTO: 0.01 10*3/MM3 (ref 0–0.05)
IMM GRANULOCYTES NFR BLD AUTO: 0.2 % (ref 0–0.5)
LYMPHOCYTES # BLD AUTO: 2.53 10*3/MM3 (ref 0.7–3.1)
LYMPHOCYTES NFR BLD AUTO: 38.2 % (ref 19.6–45.3)
MCH RBC QN AUTO: 29.2 PG (ref 26.6–33)
MCHC RBC AUTO-ENTMCNC: 33.3 G/DL (ref 31.5–35.7)
MCV RBC AUTO: 87.5 FL (ref 79–97)
MONOCYTES # BLD AUTO: 0.49 10*3/MM3 (ref 0.1–0.9)
MONOCYTES NFR BLD AUTO: 7.4 % (ref 5–12)
NEUTROPHILS NFR BLD AUTO: 3.48 10*3/MM3 (ref 1.7–7)
NEUTROPHILS NFR BLD AUTO: 52.5 % (ref 42.7–76)
NRBC BLD AUTO-RTO: 0 /100 WBC (ref 0–0.2)
PLATELET # BLD AUTO: 278 10*3/MM3 (ref 140–450)
PMV BLD AUTO: 9.6 FL (ref 6–12)
POTASSIUM SERPL-SCNC: 4.2 MMOL/L (ref 3.5–4.7)
PROT SERPL-MCNC: 8.1 G/DL (ref 6.3–8)
RBC # BLD AUTO: 4.8 10*6/MM3 (ref 3.77–5.28)
SODIUM SERPL-SCNC: 140 MMOL/L (ref 134–145)
WBC NRBC COR # BLD: 6.62 10*3/MM3 (ref 3.4–10.8)

## 2022-04-06 PROCEDURE — 36415 COLL VENOUS BLD VENIPUNCTURE: CPT

## 2022-04-06 PROCEDURE — 83036 HEMOGLOBIN GLYCOSYLATED A1C: CPT | Performed by: INTERNAL MEDICINE

## 2022-04-06 PROCEDURE — 99214 OFFICE O/P EST MOD 30 MIN: CPT | Performed by: INTERNAL MEDICINE

## 2022-04-06 PROCEDURE — 85025 COMPLETE CBC W/AUTO DIFF WBC: CPT

## 2022-04-06 PROCEDURE — 80053 COMPREHEN METABOLIC PANEL: CPT

## 2022-04-06 RX ORDER — ANASTROZOLE 1 MG/1
1 TABLET ORAL DAILY
Qty: 30 TABLET | Refills: 6 | Status: SHIPPED | OUTPATIENT
Start: 2022-04-06 | End: 2022-05-06

## 2022-04-06 NOTE — TELEPHONE ENCOUNTER
Returned callto patient who was seen by Dr. Anderson this am and wants to know if a HGB A1C can be added to her lab work.  Patient stated that she is Pre-diabetic and Dr. Avendaño has done this for her before.

## 2022-04-06 NOTE — TELEPHONE ENCOUNTER
Caller: Connie Green    Relationship: Self    Best call back number: [phone]      What was the call regarding: PATIENT CALLED TO SEE IF THEY COULD CHECK HER A1C FROM HER LABS THIS MORNING    Do you require a callback: YES IF ANY QUESTIONS

## 2022-04-06 NOTE — PROGRESS NOTES
Subjective:     Reason for follow up:   1. 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 She had her MRI breasts in July 2018 and they were benign but insurance denied repeat MRIs in July 2019.    She had another MRI last August and this was abnormal and a biopsy was done which was thankfully benign she had a follow-up MRI scheduled this year and it mammogram done 2 weeks ago which was also benign    Her DEXA scan is normal and we discussed again using an aromatase inhibitor for prevention and she is agreeable to try this time.  She has not had a period for 4 years     she's feeling well and trying to exercise and eat a healthy diet. 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)   • Dermatofibroma   • Impaired glucose tolerance   • Insomnia   • Malaise and fatigue   • Vitamin D deficiency     Social History   Social History     Socioeconomic History   • Marital status:      Spouse name: Yelitza   • Number of children: 3   • Years of education: High school   Tobacco Use   • Smoking status: Never Smoker   • Smokeless tobacco: Never Used   Substance and Sexual Activity   • Alcohol use: No   • Drug use: No   • Sexual activity: Defer     Partners: Male     Comment:       Family History  Family History   Problem Relation Age of Onset   • Clotting disorder Father    • Heart failure Father    •  "Heart attack Father    • Heart disease Father    • Alcohol abuse Brother    • Prostate cancer Brother 51   • Hypertension Brother    • Heart attack Paternal Grandmother    • Heart failure Paternal Grandmother    • Heart disease Paternal Grandmother    • Hypertension Paternal Grandmother    • Diabetes Maternal Uncle    • Heart disease Maternal Grandmother    • Hypertension Maternal Grandmother    • Heart disease Maternal Grandfather    • Diabetes Maternal Grandfather    • Heart disease Paternal Grandfather    • Skin cancer Brother 58        Skin/neck cancer   • Hypertension Brother    • Thrombophilia Other    • Alcohol abuse Son      Allergies  Allergies   Allergen Reactions   • Codeine Nausea And Vomiting       Medications: The current medication list was reviewed in the EMR.    Review of Systems  Review of Systems   Constitutional: Negative.  Negative for chills, fatigue and fever.   Respiratory: Negative.  Negative for cough, chest tightness and shortness of breath.    Cardiovascular: Negative.  Negative for chest pain.   Gastrointestinal: Negative.  Negative for abdominal pain, constipation, diarrhea, nausea and vomiting.   Genitourinary: Negative for difficulty urinating.   Musculoskeletal: Negative for arthralgias, joint swelling and myalgias.   Neurological: Negative for dizziness and headaches.   Psychiatric/Behavioral: The patient is not nervous/anxious.        Objective   Objective:     Vitals:    04/06/22 0740   BP: 148/94   Pulse: 88   Resp: 17   Temp: 97 °F (36.1 °C)   TempSrc: Temporal   SpO2: 99%   Weight: 104 kg (228 lb 8 oz)   Height: 165.1 cm (65\")   PainSc: 0-No pain     Current Status 4/6/2022   ECOG score 0     GENERAL:  Well-developed, well-nourished female in no acute distress. Vital signs reviewed.   SKIN:  Warm, dry without rashes, purpura or petechiae.  EYES:  Pupils equal, round and reactive to light.  EOMs intact.  Conjunctivae normal.  HEAD:  Normocephalic.  EARS/NOSE/MOUTH/THROAT:  " Hearing intact. Septum midline.  No excoriations or nasal discharge. Lips normal.   NECK:  Supple with good range of motion; no thyromegaly or masses  LYMPHATICS:  No cervical, supraclavicular, axillary adenopathy.  RESP:  Lungs clear to percussion and auscultation. Good airflow. Normal respiratory effort.  No abdomen  CHEST: Breast exam- Yes, describe: Bilateral breast exam performed no masses detected bilaterally   cARDIAC:  Regular rate and rhythm without murmurs, rubs or gallops. Normal S1,S2. No edema  GI:  Soft, nontender, normal bowel sounds  MSK:  No clubbing or cyanosis, No joint swelling noted in hands  PSYCHIATRIC:  Normal affect and mood.  Alert and Oriented x 3.   I have reexamined the patient and the results are consistent with the previously documented exam. Mitchell Anedrson MD        Labs and Imaging  Lab Results   Component Value Date    WBC 6.62 04/06/2022    HGB 14.0 04/06/2022    HCT 42.0 04/06/2022    MCV 87.5 04/06/2022     04/06/2022     * I reviewed scans myself and concur with the below dictation.      SCANNED - MAMMO           Assessment/Plan     Assessment:   1. 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 I also did not think that AI with ovarian suppression is in her best interest since this is DCIS and not invasive. Her labs 7/2018 are in the menopausal range, but her last period was just a few months ago, thus I'd prefer to wait to start any AI until next February. I discussed with patient today the possibility of AI for risk reduction in 6 months, and I don't think she's particularly interested.    * Plan mammogram alternating with  MRI   * MRI July reviewed and benign, plan mammo December              *Insurance not covering MRIs              *Trial of Arimidex initiated in 4 /22     2.  Heterozygous prothrombin gene mutation and mildly low protein C (63%) with family history but no personal history of thrombosis, although she did have fetal demise at almost term. She is on a baby aspirin and has been encouraged to avoid any supplemental estrogens.     3. Uterine fibroids. Follows with Dr. Rosa.     Plan:     1.  MRI breasts because of abnormal mammogram dense breasts and previous to DCIS in the left breast without radiation and lack of prevention-if this year's MRI is negative we will go to every other year    2.  Arimidex 1 mg daily to start. The side effects and toxicities of the Aromatase inhibitors was discussed with the patient including, hot flashes, mood swings and hair thinning.Significant arthralgias and worsening bone density were also discussed.     3.  Return in 6 months for evaluation of tolerance of Arimidex and call in the interim for problems she has trouble with Arimidex we can try Aromasin and then if she has trouble again no prevention will be given

## 2022-05-31 ENCOUNTER — TELEPHONE (OUTPATIENT)
Dept: ONCOLOGY | Facility: CLINIC | Age: 57
End: 2022-05-31

## 2022-05-31 NOTE — TELEPHONE ENCOUNTER
Returned call to patient to let her know that she has not had any BRACA testing performed.  Message left.

## 2022-05-31 NOTE — TELEPHONE ENCOUNTER
Caller: Connie Green    Relationship: Self    Best call back number: [phone]    Who are you requesting to speak with (clinical staff, provider,  specific staff member): CLINICAL    What was the call regarding: PATIENT CALLING TO VERIFY IF SHE HAS COMPLETED A BRACA TEST UNDER MD JACKSON AND HOW TO GET THOSE RESULTS.    Do you require a callback: YES

## 2022-08-22 ENCOUNTER — APPOINTMENT (OUTPATIENT)
Dept: WOMENS IMAGING | Facility: HOSPITAL | Age: 57
End: 2022-08-22

## 2022-08-22 ENCOUNTER — TELEPHONE (OUTPATIENT)
Dept: ONCOLOGY | Facility: CLINIC | Age: 57
End: 2022-08-22

## 2022-08-22 ENCOUNTER — HOSPITAL ENCOUNTER (OUTPATIENT)
Dept: MRI IMAGING | Facility: HOSPITAL | Age: 57
Discharge: HOME | End: 2022-08-22
Admitting: INTERNAL MEDICINE

## 2022-08-22 DIAGNOSIS — D05.11 DUCTAL CARCINOMA IN SITU OF RIGHT BREAST: ICD-10-CM

## 2022-08-22 PROCEDURE — 77049 MRI BREAST C-+ W/CAD BI: CPT

## 2022-08-22 PROCEDURE — A9577 INJ MULTIHANCE: HCPCS | Performed by: INTERNAL MEDICINE

## 2022-08-22 PROCEDURE — 0 GADOBENATE DIMEGLUMINE 529 MG/ML SOLUTION: Performed by: INTERNAL MEDICINE

## 2022-08-22 RX ADMIN — GADOBENATE DIMEGLUMINE 20 ML: 529 INJECTION, SOLUTION INTRAVENOUS at 12:27

## 2022-08-22 NOTE — TELEPHONE ENCOUNTER
[facility] called re: pt's appointment today for mammogram. States she just had a mammogram in March and has reported no additional problems. She is not due for another mammogram until March, 2023. Unsure of why another one was scheduled for August, but appears this is not needed. Per Dr. Anderson's note, MRI will be done as scheduled and if WNL, MRI screenings will be moved to every other year.

## 2022-08-22 NOTE — TELEPHONE ENCOUNTER
Returned pt's call and let her know I will fax the order for her mammogram to [facility].

## 2022-08-22 NOTE — TELEPHONE ENCOUNTER
Caller: Connie Green    Relationship: Self    Best call back number: [phone]    Who are you requesting to speak with (clinical staff, provider,  specific staff member): CLINICAL    What was the call regarding: PATIENT WENT FOR MODESTO @ [facility] OFFICE TODAY.     OFFICE DID NOT HAVE ORDERS FOR MODESTO     PATIENT CALLING TO HAVE ORDERS RESENT OR TO DETERMINE IF MODESTO IS STILL NEEDED    Do you require a callback: YES

## 2022-10-19 ENCOUNTER — OFFICE VISIT (OUTPATIENT)
Dept: FAMILY MEDICINE CLINIC | Facility: CLINIC | Age: 57
End: 2022-10-19

## 2022-10-19 ENCOUNTER — LAB (OUTPATIENT)
Dept: FAMILY MEDICINE CLINIC | Facility: CLINIC | Age: 57
End: 2022-10-19

## 2022-10-19 VITALS
RESPIRATION RATE: 16 BRPM | WEIGHT: 225 LBS | BODY MASS INDEX: 36.16 KG/M2 | OXYGEN SATURATION: 99 % | HEART RATE: 96 BPM | HEIGHT: 66 IN | SYSTOLIC BLOOD PRESSURE: 128 MMHG | DIASTOLIC BLOOD PRESSURE: 80 MMHG

## 2022-10-19 DIAGNOSIS — Z00.00 ANNUAL PHYSICAL EXAM: ICD-10-CM

## 2022-10-19 DIAGNOSIS — E55.9 VITAMIN D DEFICIENCY: Chronic | ICD-10-CM

## 2022-10-19 DIAGNOSIS — Z83.3 FAMILY HISTORY OF DIABETES MELLITUS IN MOTHER: ICD-10-CM

## 2022-10-19 DIAGNOSIS — Z00.00 PREVENTATIVE HEALTH CARE: ICD-10-CM

## 2022-10-19 DIAGNOSIS — Z00.00 ANNUAL PHYSICAL EXAM: Primary | ICD-10-CM

## 2022-10-19 DIAGNOSIS — Z12.11 SCREENING FOR COLON CANCER: ICD-10-CM

## 2022-10-19 LAB
25(OH)D3 SERPL-MCNC: 56.1 NG/ML (ref 30–100)
ALBUMIN SERPL-MCNC: 4.3 G/DL (ref 3.5–5.2)
ALBUMIN/GLOB SERPL: 1.3 G/DL
ALP SERPL-CCNC: 58 U/L (ref 39–117)
ALT SERPL W P-5'-P-CCNC: 12 U/L (ref 1–33)
ANION GAP SERPL CALCULATED.3IONS-SCNC: 9.4 MMOL/L (ref 5–15)
AST SERPL-CCNC: 16 U/L (ref 1–32)
BACTERIA UR QL AUTO: ABNORMAL /HPF
BILIRUB SERPL-MCNC: 0.5 MG/DL (ref 0–1.2)
BILIRUB UR QL STRIP: NEGATIVE
BUN SERPL-MCNC: 16 MG/DL (ref 6–20)
BUN/CREAT SERPL: 16.5 (ref 7–25)
CALCIUM SPEC-SCNC: 9.4 MG/DL (ref 8.6–10.5)
CHLORIDE SERPL-SCNC: 99 MMOL/L (ref 98–107)
CHOLEST SERPL-MCNC: 169 MG/DL (ref 0–200)
CLARITY UR: ABNORMAL
CO2 SERPL-SCNC: 27.6 MMOL/L (ref 22–29)
COLOR UR: YELLOW
CREAT SERPL-MCNC: 0.97 MG/DL (ref 0.57–1)
DEPRECATED RDW RBC AUTO: 39.9 FL (ref 37–54)
EGFRCR SERPLBLD CKD-EPI 2021: 67.9 ML/MIN/1.73
ERYTHROCYTE [DISTWIDTH] IN BLOOD BY AUTOMATED COUNT: 12.5 % (ref 12.3–15.4)
GLOBULIN UR ELPH-MCNC: 3.2 GM/DL
GLUCOSE SERPL-MCNC: 103 MG/DL (ref 65–99)
GLUCOSE UR STRIP-MCNC: NEGATIVE MG/DL
HBA1C MFR BLD: 5.7 % (ref 3.5–5.6)
HCT VFR BLD AUTO: 40.8 % (ref 34–46.6)
HDLC SERPL-MCNC: 67 MG/DL (ref 40–60)
HGB BLD-MCNC: 13.4 G/DL (ref 12–15.9)
HGB UR QL STRIP.AUTO: NEGATIVE
HYALINE CASTS UR QL AUTO: ABNORMAL /LPF
KETONES UR QL STRIP: NEGATIVE
LDLC SERPL CALC-MCNC: 91 MG/DL (ref 0–100)
LDLC/HDLC SERPL: 1.36 {RATIO}
LEUKOCYTE ESTERASE UR QL STRIP.AUTO: ABNORMAL
MCH RBC QN AUTO: 28.7 PG (ref 26.6–33)
MCHC RBC AUTO-ENTMCNC: 32.8 G/DL (ref 31.5–35.7)
MCV RBC AUTO: 87.4 FL (ref 79–97)
NITRITE UR QL STRIP: NEGATIVE
PH UR STRIP.AUTO: 7 [PH] (ref 5–8)
PLATELET # BLD AUTO: 263 10*3/MM3 (ref 140–450)
PMV BLD AUTO: 10.5 FL (ref 6–12)
POTASSIUM SERPL-SCNC: 4.2 MMOL/L (ref 3.5–5.2)
PROT SERPL-MCNC: 7.5 G/DL (ref 6–8.5)
PROT UR QL STRIP: NEGATIVE
RBC # BLD AUTO: 4.67 10*6/MM3 (ref 3.77–5.28)
RBC # UR STRIP: ABNORMAL /HPF
REF LAB TEST METHOD: ABNORMAL
SODIUM SERPL-SCNC: 136 MMOL/L (ref 136–145)
SP GR UR STRIP: 1.01 (ref 1–1.03)
SQUAMOUS #/AREA URNS HPF: ABNORMAL /HPF
TRIGL SERPL-MCNC: 55 MG/DL (ref 0–150)
TSH SERPL DL<=0.05 MIU/L-ACNC: 3.52 UIU/ML (ref 0.27–4.2)
UROBILINOGEN UR QL STRIP: ABNORMAL
VLDLC SERPL-MCNC: 11 MG/DL (ref 5–40)
WBC # UR STRIP: ABNORMAL /HPF
WBC NRBC COR # BLD: 7.21 10*3/MM3 (ref 3.4–10.8)

## 2022-10-19 PROCEDURE — 36415 COLL VENOUS BLD VENIPUNCTURE: CPT

## 2022-10-19 PROCEDURE — 83036 HEMOGLOBIN GLYCOSYLATED A1C: CPT | Performed by: NURSE PRACTITIONER

## 2022-10-19 PROCEDURE — 82306 VITAMIN D 25 HYDROXY: CPT | Performed by: NURSE PRACTITIONER

## 2022-10-19 PROCEDURE — 81001 URINALYSIS AUTO W/SCOPE: CPT | Performed by: NURSE PRACTITIONER

## 2022-10-19 PROCEDURE — 99396 PREV VISIT EST AGE 40-64: CPT | Performed by: NURSE PRACTITIONER

## 2022-10-19 PROCEDURE — 90471 IMMUNIZATION ADMIN: CPT | Performed by: NURSE PRACTITIONER

## 2022-10-19 PROCEDURE — 80061 LIPID PANEL: CPT | Performed by: NURSE PRACTITIONER

## 2022-10-19 PROCEDURE — 90686 IIV4 VACC NO PRSV 0.5 ML IM: CPT | Performed by: NURSE PRACTITIONER

## 2022-10-19 PROCEDURE — 80050 GENERAL HEALTH PANEL: CPT | Performed by: NURSE PRACTITIONER

## 2022-10-19 NOTE — PROGRESS NOTES
"Chief Complaint  Annual Exam    Subjective          Connie Green presents to [facility]  History of Present Illness    Is here today for a physical exam    Has h/o breast ca (dcis), protein c def., vitamin d def., dermatofibroma, insomnia  Her current medicines are asa, vit d, cinnamon, DHA, krill oil, mvi    Colon cancer screening was done as cologuard last in 2019    A1C in April was elevated at 6.0, is working on diet changes  Has recently learned that her mother is diabetic, she also has h/o gestational diabetes    Diet is nutritional and balanced    Exercises regularly, tries to get 30 minutes daily      Review of Systems   Constitutional: Negative for appetite change, fatigue and fever.   Respiratory: Negative.  Negative for cough, shortness of breath and wheezing.    Cardiovascular: Negative.  Negative for chest pain and palpitations.   Gastrointestinal: Negative.  Negative for abdominal pain, blood in stool, constipation and diarrhea.   Genitourinary: Negative.  Negative for dysuria, frequency and urgency.   Musculoskeletal: Negative.  Negative for arthralgias and myalgias.   Neurological: Negative.  Negative for dizziness, weakness and headaches.   Psychiatric/Behavioral: Positive for sleep disturbance. Negative for dysphoric mood. The patient is not nervous/anxious.         Has some sleep disturbances, uses valerian root with good results     Objective   Vital Signs:  /80 (BP Location: Left arm, Patient Position: Sitting)   Pulse 96   Resp 16   Ht 167.6 cm (66\")   Wt 102 kg (225 lb)   SpO2 99%   BMI 36.32 kg/m²     BP Readings from Last 3 Encounters:   10/19/22 128/80   04/06/22 148/94   10/14/21 110/84        Wt Readings from Last 3 Encounters:   10/19/22 102 kg (225 lb)   04/06/22 104 kg (228 lb 8 oz)   10/14/21 103 kg (226 lb)        Class 2 Severe Obesity (BMI >=35 and <=39.9). Obesity-related health conditions include the following: none. Obesity is " unchanged. BMI is is above average; BMI management plan is completed. We discussed low calorie, low carb based diet program, portion control and increasing exercise.      Physical Exam  Vitals reviewed.   Constitutional:       Appearance: Normal appearance. She is well-developed and well-groomed. She is obese.   HENT:      Head: Normocephalic.      Right Ear: Tympanic membrane and ear canal normal.      Left Ear: Tympanic membrane and ear canal normal.      Nose: Nose normal.      Mouth/Throat:      Mouth: Mucous membranes are moist.      Pharynx: Oropharynx is clear.   Eyes:      Extraocular Movements: Extraocular movements intact.   Neck:      Thyroid: No thyromegaly.      Vascular: No carotid bruit.   Cardiovascular:      Rate and Rhythm: Normal rate and regular rhythm.      Pulses: Normal pulses.      Heart sounds: Normal heart sounds.   Pulmonary:      Effort: Pulmonary effort is normal.      Breath sounds: Normal breath sounds.   Abdominal:      General: Bowel sounds are normal.      Palpations: Abdomen is soft.      Tenderness: There is no abdominal tenderness. There is no right CVA tenderness or left CVA tenderness.   Musculoskeletal:         General: Normal range of motion.      Cervical back: Normal range of motion and neck supple. No tenderness.      Right lower leg: No edema.      Left lower leg: No edema.   Lymphadenopathy:      Cervical: No cervical adenopathy.   Skin:     General: Skin is warm.   Neurological:      Mental Status: She is alert and oriented to person, place, and time.   Psychiatric:         Mood and Affect: Mood normal.        Result Review :     CMP    CMP 4/6/22   Glucose 112   BUN 16   Creatinine 1.00   Sodium 140   Potassium 4.2   Chloride 103   Calcium 9.6   Albumin 4.70   Total Bilirubin 0.4   Alkaline Phosphatase 64   AST (SGOT) 27   ALT (SGPT) 25           CBC    CBC 4/6/22   WBC 6.62   RBC 4.80   Hemoglobin 14.0   Hematocrit 42.0   MCV 87.5   MCH 29.2   MCHC 33.3   RDW 13.2    Platelets 278                   Most Recent A1C    HGBA1C Most Recent 4/6/22   Hemoglobin A1C 6.00 (A)   (A) Abnormal value                      Assessment and Plan    Diagnoses and all orders for this visit:    1. Annual physical exam (Primary)  -     Comprehensive Metabolic Panel; Future  -     CBC (No Diff); Future  -     Urinalysis With Culture If Indicated - Urine, Clean Catch; Future  -     TSH Rfx On Abnormal To Free T4; Future    2. Preventative health care  -     Lipid Panel; Future    3. Vitamin D deficiency  -     Vitamin D 25 Hydroxy; Future    4. Screening for colon cancer  -     Cologuard - Stool, Per Rectum; Future    5. Family history of diabetes mellitus in mother  -     Hemoglobin A1c; Future    Other orders  -     FluLaval/Fluzone >6 mos ([id number])             Follow Up   Return in about 1 year (around 10/19/2023), or as needed, for Annual physical.  Patient was given instructions and counseling regarding her condition or for health maintenance advice. Please see specific information pulled into the AVS if appropriate.

## 2023-02-16 ENCOUNTER — LAB (OUTPATIENT)
Dept: LAB | Facility: HOSPITAL | Age: 58
End: 2023-02-16
Payer: COMMERCIAL

## 2023-02-16 ENCOUNTER — OFFICE VISIT (OUTPATIENT)
Dept: ONCOLOGY | Facility: CLINIC | Age: 58
End: 2023-02-16
Payer: COMMERCIAL

## 2023-02-16 VITALS
TEMPERATURE: 97.5 F | HEART RATE: 78 BPM | OXYGEN SATURATION: 99 % | HEIGHT: 66 IN | BODY MASS INDEX: 37.14 KG/M2 | SYSTOLIC BLOOD PRESSURE: 135 MMHG | WEIGHT: 231.1 LBS | RESPIRATION RATE: 18 BRPM | DIASTOLIC BLOOD PRESSURE: 90 MMHG

## 2023-02-16 DIAGNOSIS — D68.52 HETEROZYGOUS FOR PROTHROMBIN G20210A MUTATION: ICD-10-CM

## 2023-02-16 DIAGNOSIS — D05.11 DUCTAL CARCINOMA IN SITU OF RIGHT BREAST: Primary | ICD-10-CM

## 2023-02-16 DIAGNOSIS — D05.11 DUCTAL CARCINOMA IN SITU OF RIGHT BREAST: ICD-10-CM

## 2023-02-16 LAB
ALBUMIN SERPL-MCNC: 4.5 G/DL (ref 3.5–5.2)
ALBUMIN/GLOB SERPL: 1.4 G/DL (ref 1.1–2.4)
ALP SERPL-CCNC: 59 U/L (ref 38–116)
ALT SERPL W P-5'-P-CCNC: 16 U/L (ref 0–33)
ANION GAP SERPL CALCULATED.3IONS-SCNC: 10.9 MMOL/L (ref 5–15)
AST SERPL-CCNC: 20 U/L (ref 0–32)
BASOPHILS # BLD AUTO: 0.03 10*3/MM3 (ref 0–0.2)
BASOPHILS NFR BLD AUTO: 0.5 % (ref 0–1.5)
BILIRUB SERPL-MCNC: 0.3 MG/DL (ref 0.2–1.2)
BUN SERPL-MCNC: 20 MG/DL (ref 6–20)
BUN/CREAT SERPL: 21.1 (ref 7.3–30)
CALCIUM SPEC-SCNC: 9.5 MG/DL (ref 8.5–10.2)
CHLORIDE SERPL-SCNC: 103 MMOL/L (ref 98–107)
CO2 SERPL-SCNC: 26.1 MMOL/L (ref 22–29)
CREAT SERPL-MCNC: 0.95 MG/DL (ref 0.6–1.1)
DEPRECATED RDW RBC AUTO: 43.1 FL (ref 37–54)
EGFRCR SERPLBLD CKD-EPI 2021: 69.6 ML/MIN/1.73
EOSINOPHIL # BLD AUTO: 0.07 10*3/MM3 (ref 0–0.4)
EOSINOPHIL NFR BLD AUTO: 1.1 % (ref 0.3–6.2)
ERYTHROCYTE [DISTWIDTH] IN BLOOD BY AUTOMATED COUNT: 13.2 % (ref 12.3–15.4)
GLOBULIN UR ELPH-MCNC: 3.3 GM/DL (ref 1.8–3.5)
GLUCOSE SERPL-MCNC: 123 MG/DL (ref 74–124)
HCT VFR BLD AUTO: 41.1 % (ref 34–46.6)
HGB BLD-MCNC: 13.1 G/DL (ref 12–15.9)
IMM GRANULOCYTES # BLD AUTO: 0.01 10*3/MM3 (ref 0–0.05)
IMM GRANULOCYTES NFR BLD AUTO: 0.2 % (ref 0–0.5)
LYMPHOCYTES # BLD AUTO: 3.28 10*3/MM3 (ref 0.7–3.1)
LYMPHOCYTES NFR BLD AUTO: 49.6 % (ref 19.6–45.3)
MCH RBC QN AUTO: 28.4 PG (ref 26.6–33)
MCHC RBC AUTO-ENTMCNC: 31.9 G/DL (ref 31.5–35.7)
MCV RBC AUTO: 89.2 FL (ref 79–97)
MONOCYTES # BLD AUTO: 0.49 10*3/MM3 (ref 0.1–0.9)
MONOCYTES NFR BLD AUTO: 7.4 % (ref 5–12)
NEUTROPHILS NFR BLD AUTO: 2.73 10*3/MM3 (ref 1.7–7)
NEUTROPHILS NFR BLD AUTO: 41.2 % (ref 42.7–76)
NRBC BLD AUTO-RTO: 0 /100 WBC (ref 0–0.2)
PLATELET # BLD AUTO: 248 10*3/MM3 (ref 140–450)
PMV BLD AUTO: 9.1 FL (ref 6–12)
POTASSIUM SERPL-SCNC: 3.9 MMOL/L (ref 3.5–4.7)
PROT SERPL-MCNC: 7.8 G/DL (ref 6.3–8)
RBC # BLD AUTO: 4.61 10*6/MM3 (ref 3.77–5.28)
SODIUM SERPL-SCNC: 140 MMOL/L (ref 134–145)
WBC NRBC COR # BLD: 6.61 10*3/MM3 (ref 3.4–10.8)

## 2023-02-16 PROCEDURE — 99213 OFFICE O/P EST LOW 20 MIN: CPT | Performed by: INTERNAL MEDICINE

## 2023-02-16 PROCEDURE — 82306 VITAMIN D 25 HYDROXY: CPT | Performed by: INTERNAL MEDICINE

## 2023-02-16 PROCEDURE — 83036 HEMOGLOBIN GLYCOSYLATED A1C: CPT | Performed by: INTERNAL MEDICINE

## 2023-02-16 PROCEDURE — 36415 COLL VENOUS BLD VENIPUNCTURE: CPT

## 2023-02-16 PROCEDURE — 85025 COMPLETE CBC W/AUTO DIFF WBC: CPT

## 2023-02-16 PROCEDURE — 80053 COMPREHEN METABOLIC PANEL: CPT

## 2023-02-16 NOTE — PROGRESS NOTES
Subjective:     Reason for follow up:   1. 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  She agreed to try an aromatase inhibitor at her last visit but then again decided not to do it      she's feeling well and trying to exercise and eat a healthy diet. No new breast concerns     she is on a baby aspirin    I told her at this point that she should be followed at the high risk clinic where her imaging would be schedule unless she decides at some point to take prevention and they will send her back to us    Reviewed, confirmed and updated history (past medical, social and family)   Past Medical   Past Medical History:   Diagnosis Date   • Breast cancer (HCC) 01/2017    Right breast   • Cancer (HCC)     right breast cancer   • Prothrombin gene mutation (HCC)      Patient Active Problem List   Diagnosis   • Ductal carcinoma in situ of right breast   • Protein C deficiency (HCC)   • 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)   • Dermatofibroma   • Impaired glucose tolerance   • Insomnia   • Malaise and fatigue   • Vitamin D deficiency     Social History   Social History     Socioeconomic History   • Marital status:      Spouse name: Yelitza   • Number of children: 3   • Years of education: High school   Tobacco Use   • Smoking status: Never   • Smokeless tobacco: Never   Substance and Sexual Activity   • Alcohol use: No   • Drug use: No   • Sexual activity: Defer     Partners: Male     Comment:       Family History  Family History   Problem Relation Age of Onset   • Clotting disorder Father    • Heart failure Father    • Heart attack Father    • Heart disease Father    • Alcohol abuse Brother    • Prostate cancer Brother  "51   • Hypertension Brother    • Heart attack Paternal Grandmother    • Heart failure Paternal Grandmother    • Heart disease Paternal Grandmother    • Hypertension Paternal Grandmother    • Diabetes Maternal Uncle    • Heart disease Maternal Grandmother    • Hypertension Maternal Grandmother    • Heart disease Maternal Grandfather    • Diabetes Maternal Grandfather    • Heart disease Paternal Grandfather    • Skin cancer Brother 58        Skin/neck cancer   • Hypertension Brother    • Thrombophilia Other    • Alcohol abuse Son    • Anxiety disorder Mother    • Diabetes Mother    • Anxiety disorder Sister      Allergies  Allergies   Allergen Reactions   • Codeine Nausea And Vomiting       Medications: The current medication list was reviewed in the EMR.    Review of Systems  Review of Systems   Constitutional: Negative.  Negative for chills, fatigue and fever.   Respiratory: Negative.  Negative for cough, chest tightness and shortness of breath.    Cardiovascular: Negative.  Negative for chest pain.   Gastrointestinal: Negative.  Negative for abdominal pain, constipation, diarrhea, nausea and vomiting.   Genitourinary: Negative for difficulty urinating.   Musculoskeletal: Negative for arthralgias, joint swelling and myalgias.   Neurological: Negative for dizziness and headaches.   Psychiatric/Behavioral: The patient is not nervous/anxious.        Objective   Objective:     Vitals:    02/16/23 1508   BP: 135/90   Pulse: 78   Resp: 18   Temp: 97.5 °F (36.4 °C)   TempSrc: Temporal   SpO2: 99%   Weight: 105 kg (231 lb 1.6 oz)   Height: 167.6 cm (65.98\")   PainSc: 0-No pain     Current Status 2/16/2023   ECOG score 0     GENERAL:  Well-developed, well-nourished female in no acute distress. Vital signs reviewed.   SKIN:  Warm, dry without rashes, purpura or petechiae.  EYES:  Pupils equal, round and reactive to light.  EOMs intact.  Conjunctivae normal.  HEAD:  Normocephalic.  EARS/NOSE/MOUTH/THROAT:  Hearing intact. " Septum midline.  No excoriations or nasal discharge. Lips normal.   NECK:  Supple with good range of motion; no thyromegaly or masses  LYMPHATICS:  No cervical, supraclavicular, axillary adenopathy.  RESP:  Lungs clear to percussion and auscultation. Good airflow. Normal respiratory effort.  No abdomen  : Breast exam- Yes, describe: Bilateral breast exam performed no masses detected bilaterally   cARDIAC:  Regular rate and rhythm without murmurs, rubs or gallops. Normal S1,S2. No edema  GI:  Soft, nontender, normal bowel sounds  MSK:  No clubbing or cyanosis, No joint swelling noted in hands  PSYCHIATRIC:  Normal affect and mood.  Alert and Oriented x 3.   I have reexamined the patient and the results are consistent with the previously documented exam. Mitchell Anderson MD        Labs and Imaging  Lab Results   Component Value Date    WBC 6.61 02/16/2023    HGB 13.1 02/16/2023    HCT 41.1 02/16/2023    MCV 89.2 02/16/2023     02/16/2023     * I reviewed scans myself and concur with the below dictation.      SCANNED - MAMMO           Assessment/Plan     Assessment:   1. 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 I also did not think that AI with ovarian suppression is in her best interest since this is DCIS and not invasive. Her labs 7/2018 are in the menopausal range, but her last period was just a few months ago, thus I'd prefer to wait to start any AI until next February. I discussed with patient today the possibility of AI for risk reduction in 6 months, and I don't think she's particularly interested.    * Plan mammogram alternating with MRI   * MRI July  reviewed and benign, plan mammo December              *Insurance not covering MRIs              *Trial of Arimidex initiated in 4 /22                     -Patient never took this    2.  Heterozygous prothrombin gene mutation and mildly low protein C (63%) with family history but no personal history of thrombosis, although she did have fetal demise at almost term. She is on a baby aspirin and has been encouraged to avoid any supplemental estrogens.     3. Uterine fibroids. Follows with Dr. Rosa.     Plan:     1.  Refer to high risk clinic to follow-up with Dr. Sanchez to set up for imaging as she is not interested in prevention medications  No follow-up needed in our office at this time

## 2023-02-17 LAB
25(OH)D3 SERPL-MCNC: 57.4 NG/ML (ref 30–100)
HBA1C MFR BLD: 5.9 % (ref 4.8–5.6)

## 2023-03-06 ENCOUNTER — TELEPHONE (OUTPATIENT)
Dept: SURGERY | Facility: CLINIC | Age: 58
End: 2023-03-06
Payer: COMMERCIAL

## 2023-03-06 ENCOUNTER — OFFICE VISIT (OUTPATIENT)
Dept: MAMMOGRAPHY | Facility: CLINIC | Age: 58
End: 2023-03-06
Payer: COMMERCIAL

## 2023-03-06 ENCOUNTER — CLINICAL SUPPORT (OUTPATIENT)
Dept: GENETICS | Facility: HOSPITAL | Age: 58
End: 2023-03-06
Payer: COMMERCIAL

## 2023-03-06 VITALS
SYSTOLIC BLOOD PRESSURE: 124 MMHG | BODY MASS INDEX: 36.11 KG/M2 | HEART RATE: 89 BPM | DIASTOLIC BLOOD PRESSURE: 84 MMHG | HEIGHT: 66 IN | OXYGEN SATURATION: 98 % | WEIGHT: 224.7 LBS | TEMPERATURE: 97.3 F

## 2023-03-06 DIAGNOSIS — D05.11 DUCTAL CARCINOMA IN SITU OF RIGHT BREAST: Primary | ICD-10-CM

## 2023-03-06 DIAGNOSIS — D05.11 DUCTAL CARCINOMA IN SITU OF RIGHT BREAST: ICD-10-CM

## 2023-03-06 DIAGNOSIS — Z13.79 GENETIC TESTING: Primary | ICD-10-CM

## 2023-03-06 DIAGNOSIS — Z91.89 INCREASED RISK OF BREAST CANCER: ICD-10-CM

## 2023-03-06 DIAGNOSIS — Z80.41 FAMILY HISTORY OF OVARIAN CANCER: ICD-10-CM

## 2023-03-06 DIAGNOSIS — Z80.42 FAMILY HISTORY OF PROSTATE CANCER: ICD-10-CM

## 2023-03-06 PROCEDURE — 99204 OFFICE O/P NEW MOD 45 MIN: CPT | Performed by: SURGERY

## 2023-03-06 PROCEDURE — 96040: CPT

## 2023-03-06 NOTE — PROGRESS NOTES
Connie Green, a 57-year-old female, was seen for genetic counseling due to a personal history of breast cancer. Ms. Green reports a previous history of a right breast ductal carcinoma in situ in 2016. The DCIS was ER/CT positive. She underwent a lumpectomy and did not have further treatment. Ms. Green retains her uterus and ovaries. She has not yet had a colonoscopy, but reports negative Cologuard screening. She was interested in discussing her risk for a hereditary cancer syndrome.  Ms. Green was interested in pursuing a multi-gene panel to evaluate her risk of cancer, therefore the CancerNext panel was ordered through [facility] which analyzes BRCA1/2 and 34 additional genes associated with an increased cancer risk. Her blood was drawn on 3/6/2023. Results are expected in 2-3 weeks.    PERTINENT FAMILY HISTORY: (See attached pedigree)   Brother:      Prostate cancer, 58        Melanoma, 62  Mat. First Cousin Once Removed:   Ovarian cancer, 90s    Records regarding the family history were not available for review.     RISK ASSESSMENT:  Ms. Green’s personal and family history of cancer led to concern for a hereditary cancer syndrome. We discussed BRCA1/2 testing as well as the option of pursuing a panel that would test for other genes known to impact cancer risk in addition to BRCA1/2. NCCN criteria for genetic testing state that any individual with a close blood relative (1st, 2nd, or 3rd degree) diagnosed with ovarian cancer may consider genetic testing. As Ms. Green’s cousin diagnosed with ovarian cancer is a 4th degree relative, she does not meet NCCN criteria for genetic testing. Despite this, we discussed how genetic testing was still available to her. These risk assessments are based on the family history information provided at the time of the appointment, and could change in the future should new information be obtained.    GENETIC COUNSELING: We reviewed the family history information in detail.  Cases of cancer follow three general patterns: sporadic, familial, and hereditary.  While most cancer is sporadic, some cases appear to occur in family clusters.  These cases are said to be familial and account for 10-20% of cancer cases.  Familial cases may be due to a combination of shared genes and environmental factors among family members.  In even fewer families, the cancer is said to be inherited, and the genes responsible for the cancer are known.      Family histories typical of hereditary cancer syndromes usually include multiple first- and second-degree relatives diagnosed with cancer types that define a syndrome.  These cases tend to be diagnosed at younger-than-expected ages and can be bilateral or multifocal.  The cancer in these families follows an autosomal dominant inheritance pattern, which indicates the likely presence of a mutation in a cancer susceptibility gene.  Children and siblings of an individual believed to carry this mutation have a 50% chance of inheriting that mutation, thereby inheriting the increased risk to develop cancer.  These mutations can be passed down from the maternal or the paternal lineage.    Hereditary breast cancer accounts for 5-10% of all cases of breast cancer.  A significant proportion of hereditary breast and ovarian cancer can be attributed to mutations in the BRCA1 and BRCA2 genes.  Mutations in these genes confer an increased risk for breast cancer, ovarian cancer, male breast cancer, prostate cancer, and pancreatic cancer. Women with a BRCA1 or BRCA2 mutation who have already been diagnosed with breast cancer have a 40-60% lifetime risk of a second breast cancer. Women with a BRCA1 or BRCA2 mutation have up to a 44% risk of ovarian cancer.  BRCA1 has been more significantly associated with triple negative breast cancers than other genes. There are other genes considered to confer a high risk for breast cancer.    There are other genes that are known to be  associated with an increased risk for cancer.  Some of these genes have well defined cancer risks and established management guidelines.  Other genes that can be tested for have been more recently described, and there may be less data regarding the risks and therefore may not have established management guidelines. We reviewed that in some cases, the identification of a genetic mutation may impact treatment options for some types of cancer. We discussed these limitations at length.  Based on Ms. Green’s desire to get as much information as possible regarding her personal risks and potential risks for her family, she opted to pursue testing through a panel that would look at several other genes known to increase the risk for cancer.    GENETIC TESTING:  The risks, benefits and limitations of genetic testing and implications for clinical management following testing were reviewed.  DNA test results can influence decisions regarding screening, prevention and surgical management.  Genetic testing can have significant psychological implications for both individuals and families.  Also discussed was the possibility of employment and insurance discrimination based on genetic test results and the laws in place to prevent this (CHASITY).    We discussed panel testing, which would involve testing for BRCA1/2 as well as 34 additional genes that are associated with increased cancer risk. The benefits and limitations of genetic testing were discussed and Ms. Green decided to pursue testing via the panel. The implications of a positive or negative test result were discussed. We discussed the possibility that, in some cases, genetic test results may be informative or may be ambiguous due to the identification of a genetic variant of uncertain significance (VUS). These variants may or may not be associated with an increased cancer risk.  With multigene panel testing, it is not uncommon for a VUS to be identified.  If a VUS is  identified, testing family members is typically not recommended and screening recommendations are made based on the family history.  The laboratories that perform genetic testing work to reclassify the VUS and send out an amended report if and when a VUS is reclassified.  The majority of variant findings are ultimately reclassified to a negative result.  Given her personal and family history, a negative test result would not eliminate all cancer risk to her relatives, although the risk may not be as high as it would with positive genetic testing.      PLAN: Genetic testing via the CancerNext panel through [facility] was ordered. Her blood was drawn 3/6/23 for testing. Results are expected in 2-3 weeks. Ms. Green is welcome to contact us in the meantime with any questions she may have at [phone].    Brittanie Daugherty MS, [employer], [facility]  Licensed Certified Genetic Counselor

## 2023-03-06 NOTE — TELEPHONE ENCOUNTER
Pt notified of the following appt:    Breast MRI is scheduled at [facility] on 08/24/2023 arrive at 8:15.    Pt verbalized understanding    CMA

## 2023-03-06 NOTE — PROGRESS NOTES
Chief Complaint: Connie Green is a 57 y.o.. female here today for establishment of care      History of Present Illness:  Patient presents with management of breast cancer risk.   She is a very nice 57-year-old white female that I treated for DCIS of the right breast in 2017.  She had a needle localized lumpectomy and we achieved clear margins.  The patient was premenopausal at the time but unable to take tamoxifen because of a blood clotting history.  She was not in favor of ovarian suppression and an AI.  She has followed with Dr. Anderson and her imaging has been fairly stable.  In 2021 and MRI revealed an area of suspicious enhancement.  A biopsy subsequently revealed some fibrocystic change with hyperplasia of the usual type but no atypia or cancer.  She has had a follow-up MRI in August 20 22.  I have personally reviewed those imaging studies and do not see any suspicious areas of masslike or non-mass-like enhancement.  There are no enlarged lymph nodes.  The patient also had screening mammography in February 2023.  I have also personally reviewed those imaging studies.  She has a marker identified in the scar from her lumpectomy.  Beneath that there is some mild postoperative change.  The breast tissue remains heterogeneously dense on both sides but I do not see any worrisome masses or calcifications.    The patient was questioning whether she should have genetic testing and upon review of her history there is a maternal aunt who had ovarian cancer.  Her mother is 88 and alive without any known cancers.    Review of Systems:  Review of Systems   Skin:        No changes have been noted to the skin of the breast.   All other systems reviewed and are negative.     I have reviewed the ROS as documented by the MA/LPN/RN Neal Sanchez MD      Past Medical and Surgical History:  Breast Biopsy History:  Patient has had the following breast biopsies:Core biopsy in 2017 and MRI directed biopsy 2021  Breast Cancer  HIstory:  Patient has the following past medical history of breast cancer treatment: She underwent right breast lumpectomy alone.  There was no radiation treatment and the patient did not receive hormone blocking therapy.  Breast Operations, and year:  2017 right breast lumpectomy with needle localization  Social History     Tobacco Use   Smoking Status Never   • Passive exposure: Never   Smokeless Tobacco Never     Obstetric History:  Patient is postmenopausal, entered menopause naturally at age: Unknown  Number of pregnancies:4  Number of live births: 3  Number of abortions or miscarriages: 1  Age of delivery of first child: 17  Patient breast fed, for the following lenth of time: 9 months  Length of time taking birth control pills: Unknown  Patient has never taken hormone replacement    Past Surgical History:   Procedure Laterality Date   • BREAST BIOPSY Right 01/2017    Malignant   • BREAST LUMPECTOMY Right 3/1/2017    Procedure: RIGHT BREAST LUMPECTOMY WITH NEEDLE LOCALIZATION;  Surgeon: Neal Sanchez MD;  Location: [facility];  Service:    • ENDOMETRIAL ABLATION  2007   • LAPAROSCOPIC TUBAL LIGATION     • TUBAL ABDOMINAL LIGATION         Past Medical History:   Diagnosis Date   • Breast cancer (HCC) 01/2017    Right breast   • Cancer (HCC)     right breast cancer   • Prothrombin gene mutation (HCC)        Prior Hospitalizations, other than for surgery or childbirth, and year:  None    Social History:  Patient is .  Patient has 1 daughters. and Patient has 2 sons.    Family History:  Family History   Problem Relation Age of Onset   • Clotting disorder Father    • Heart failure Father    • Heart attack Father    • Heart disease Father    • Alcohol abuse Brother         2   • Prostate cancer Brother    • Hypertension Brother    • Heart attack Paternal Grandmother    • Heart failure Paternal Grandmother    • Heart disease Paternal Grandmother    • Hypertension Paternal Grandmother    • Diabetes  Maternal Uncle    • Heart disease Maternal Grandmother    • Hypertension Maternal Grandmother    • Heart disease Maternal Grandfather    • Diabetes Maternal Grandfather    • Heart disease Paternal Grandfather    • Skin cancer Brother         Skin/neck cancer   • Hypertension Brother    • Thrombophilia Other    • Alcohol abuse Son    • Anxiety disorder Mother    • Diabetes Mother    • Anxiety disorder Sister        Vital Signs:  Vitals:    03/06/23 1418   BP: 124/84   Pulse: 89   Temp: 97.3 °F (36.3 °C)   SpO2: 98%       Medications:    Current Outpatient Prescriptions:     Current Outpatient Medications:   •  aspirin 81 MG EC tablet, Take 1 tablet by mouth Daily. HOLD PER MD INSTR, Disp: , Rfl:   •  Calcium-Magnesium 500-250 MG tablet, , Disp: , Rfl:   •  Chlorophyllin Copper Cmplx Sod powder, , Disp: , Rfl:   •  cholecalciferol (VITAMIN D3) 10 MCG (400 UNIT) tablet, , Disp: , Rfl:   •  Cinnamon 500 MG capsule, 1,200 mg., Disp: , Rfl:   •  guaiFENesin (HERBAL EXPEC PO), Take  by mouth Daily., Disp: , Rfl:   •  Misc Natural Products (ELDERBERRY IMMUNE COMPLEX PO), , Disp: , Rfl:   •  Multiple Vitamins-Minerals (MULTI COMPLETE PO), Take  by mouth., Disp: , Rfl:   •  Omega-3 Fatty Acids (Super Omega 3 EPA/DHA) 1000 MG capsule, , Disp: , Rfl:   •  SALINE NASAL SPRAY NA, , Disp: , Rfl:   •  Valerian Root 530 MG capsule, , Disp: , Rfl:   •  Multiple Vitamins-Minerals (HEALTHY EYES PO), Take  by mouth., Disp: , Rfl:     Physical Examination:  General Appearance:   Patient is in no distress.  She is well kept and has a BMI of 36.3  Psychiatric:  Patient with appropriate mood and affect. Alert and oriented to self, time, and place.    Breast, RIGHT:  large sized, symmetric with the contralateral side.  Breast skin is without erythema, edema, rashes.   She has a well-healed scar in the upper inner quadrant there are no visible abnormalities upon inspection during the arm-raising maneuver or with hands on hips in the sitting  position. There is no nipple retraction, discharge or nipple/areolar skin changes.There are no masses palpable in the sitting or supine positions.  There is a deficit of breast tissue at the incision in the upper inner quadrant related to the lumpectomy.  She has some nodular changes in the upper outer quadrant.    Breast, LEFT:  large sized, symmetric with the contralateral side.  Breast skin is without erythema, edema, rashes.  There are no visible abnormalities upon inspection during the arm-raising maneuver or with hands on hips in the sitting position. There is no nipple retraction, discharge or nipple/areolar skin changes.There are no masses palpable in the sitting or supine positions.  There are nodular changes in the upper outer quadrant similar to the other side.    Lymphatic:  There is no axillary, cervical, infraclavicular, or supraclavicular adenopathy bilaterally.    Gastrointestinal:  Abdomen is soft, nondistended, and nontender.  There was no obvious hepatosplenomegaly or abdominal mass.  There are no scars from previous surgery.    Musculoskeletal:  Good strength in all 4 extremities.   There is good range of motion in both shoulders.        Assessment:  1. Ductal carcinoma in situ of right breast    2. Increased risk of breast cancer    The patient is at some increased risk for breast cancer based on her prior history.  Her breast density also remains heterogeneously dense making imaging a little more difficult.  The patient has been adding supplemental MRI to her imaging and I think it would be a good idea to continue that.  She apparently has a maternal aunt who had ovarian cancer and she was interested in considering genetic testing.  I felt that it was worth sending her down to the genetic counselor today to discuss her options.  The patient is no longer seeing Dr. Anderson and therefore I will see her once a year      Plan:  1.  Genetic counseling referral today  2.  I will order an MRI for  August of this year.  3.  I would like to see her back in the office in a year      CPT coding:    Next Appointment:  No follow-ups on file.            EMR Dragon/transcription disclaimer:  The Dragon system was used for dictation.

## 2023-03-15 ENCOUNTER — TELEPHONE (OUTPATIENT)
Dept: GENETICS | Facility: HOSPITAL | Age: 58
End: 2023-03-15
Payer: COMMERCIAL

## 2023-03-16 ENCOUNTER — DOCUMENTATION (OUTPATIENT)
Dept: GENETICS | Facility: HOSPITAL | Age: 58
End: 2023-03-16
Payer: COMMERCIAL

## 2023-03-16 NOTE — TELEPHONE ENCOUNTER
Called patient and disclosed negative genetic testing results. Informed patient these results would be on [email or web address] and sent to Dr. Sanchez's office. Patient also requested a copy be mailed to her.

## 2023-03-16 NOTE — PROGRESS NOTES
Connie Green, a 57-year-old female, was seen for genetic counseling due to a personal history of breast cancer. Ms. Green reports a previous history of a right breast ductal carcinoma in situ in 2016. The DCIS was ER/UT positive. She underwent a lumpectomy and did not have further treatment. Ms. Green retains her uterus and ovaries. She has not yet had a colonoscopy, but reports negative Cologuard screening. She was interested in discussing her risk for a hereditary cancer syndrome.  Ms. Geren was interested in pursuing a multi-gene panel to evaluate her risk of cancer, therefore the CancerNext panel was ordered through [employer] which analyzes BRCA1/2 and 34 additional genes associated with an increased cancer risk. Genetic testing was negative for known pathogenic mutations in BRCA1/2 and 34 additional genes on this panel. These normal results were discussed with Ms. Green by telephone on 3/16/23.    PERTINENT FAMILY HISTORY: (See attached pedigree)   Brother:      Prostate cancer, 58        Melanoma, 62  Mat. First Cousin Once Removed:   Ovarian cancer, 90s    Records regarding the family history were not available for review.     RISK ASSESSMENT:  Ms. Green’s personal and family history of cancer led to concern for a hereditary cancer syndrome. We discussed BRCA1/2 testing as well as the option of pursuing a panel that would test for other genes known to impact cancer risk in addition to BRCA1/2. NCCN criteria for genetic testing state that any individual with a close blood relative (1st, 2nd, or 3rd degree) diagnosed with ovarian cancer may consider genetic testing. As Ms. Green’s cousin diagnosed with ovarian cancer is a 4th degree relative, she does not meet NCCN criteria for genetic testing. Despite this, we discussed how genetic testing was still available to her. These risk assessments are based on the family history information provided at the time of the appointment, and could change in the  future should new information be obtained.    GENETIC COUNSELING: We reviewed the family history information in detail. Cases of cancer follow three general patterns: sporadic, familial, and hereditary.  While most cancer is sporadic, some cases appear to occur in family clusters.  These cases are said to be familial and account for 10-20% of cancer cases.  Familial cases may be due to a combination of shared genes and environmental factors among family members.  In even fewer families, the cancer is said to be inherited, and the genes responsible for the cancer are known.      Family histories typical of hereditary cancer syndromes usually include multiple first- and second-degree relatives diagnosed with cancer types that define a syndrome.  These cases tend to be diagnosed at younger-than-expected ages and can be bilateral or multifocal.  The cancer in these families follows an autosomal dominant inheritance pattern, which indicates the likely presence of a mutation in a cancer susceptibility gene.  Children and siblings of an individual believed to carry this mutation have a 50% chance of inheriting that mutation, thereby inheriting the increased risk to develop cancer.  These mutations can be passed down from the maternal or the paternal lineage.    Hereditary breast cancer accounts for 5-10% of all cases of breast cancer.  A significant proportion of hereditary breast and ovarian cancer can be attributed to mutations in the BRCA1 and BRCA2 genes.  Mutations in these genes confer an increased risk for breast cancer, ovarian cancer, male breast cancer, prostate cancer, and pancreatic cancer. Women with a BRCA1 or BRCA2 mutation who have already been diagnosed with breast cancer have a 40-60% lifetime risk of a second breast cancer. Women with a BRCA1 or BRCA2 mutation have up to a 44% risk of ovarian cancer.  BRCA1 has been more significantly associated with triple negative breast cancers than other genes. There  are other genes considered to confer a high risk for breast cancer.    There are other genes that are known to be associated with an increased risk for cancer.  Some of these genes have well defined cancer risks and established management guidelines.  Other genes that can be tested for have been more recently described, and there may be less data regarding the risks and therefore may not have established management guidelines. We reviewed that in some cases, the identification of a genetic mutation may impact treatment options for some types of cancer. We discussed these limitations at length.  Based on Ms. Green’s desire to get as much information as possible regarding her personal risks and potential risks for her family, she opted to pursue testing through a panel that would look at several other genes known to increase the risk for cancer.    GENETIC TESTING:  The risks, benefits and limitations of genetic testing and implications for clinical management following testing were reviewed.  DNA test results can influence decisions regarding screening, prevention and surgical management.  Genetic testing can have significant psychological implications for both individuals and families.  Also discussed was the possibility of employment and insurance discrimination based on genetic test results and the laws in place to prevent this (CHASITY).    We discussed panel testing, which would involve testing for BRCA1/2 as well as 34 additional genes that are associated with increased cancer risk. The benefits and limitations of genetic testing were discussed and Ms. Green decided to pursue testing via the panel. The implications of a positive or negative test result were discussed. We discussed the possibility that, in some cases, genetic test results may be informative or may be ambiguous due to the identification of a genetic variant of uncertain significance (VUS). These variants may or may not be associated with an  increased cancer risk.  With multigene panel testing, it is not uncommon for a VUS to be identified.  If a VUS is identified, testing family members is typically not recommended and screening recommendations are made based on the family history.  The laboratories that perform genetic testing work to reclassify the VUS and send out an amended report if and when a VUS is reclassified.  The majority of variant findings are ultimately reclassified to a negative result.  Given her personal and family history, a negative test result would not eliminate all cancer risk to her relatives, although the risk may not be as high as it would with positive genetic testing.      TEST RESULTS:  Genetic testing was negative for known pathogenic mutations by sequencing, rearrangement testing, and RNA analysis for the 36 genes included on the CancerNext panel.   This negative result greatly lowers the risk of a hereditary cancer syndrome for Ms. Green. This assessment is based on the information provided at the time of consultation and could change should new information be obtained regarding her personal and/or family history.     CLINICAL MANAGEMENT GUIDELINES: ’s surveillance and management should be determined by her oncology team. Despite the genetic test results that were negative for known pathogenic mutations, Ms. Green’s female relatives may have a somewhat increased lifetime risk for breast cancer based on family history. Relatives may have a personalized risk assessment performed to quantify their breast cancer risk using a family history based risk model, such as the Tyrer-Cuzick model. Surveillance for individuals with a high lifetime risk of breast cancer (>20%), based on NCCN guidelines, would consist of semi-annual clinical breast exams and monthly self-breast exams starting by age 18 and annual mammography starting 10 years younger than the earliest diagnosis in the family, or age 40, whichever is earliest.   According to an American Cancer Society expert panel, annual breast MRI should be offered to women whose lifetime risk of breast cancer is 20-25 percent or more, typically beginning at the same age as mammography.    PLAN: Genetic counseling remains available to Ms. Green. We encourage her to contact our office with any questions she may have at [phone].    Brittanie Daugherty MS, [facility], [facility]  Licensed Certified Genetic Counselor     Cc: Connie Sanchez MD

## 2023-03-28 ENCOUNTER — OFFICE VISIT (OUTPATIENT)
Dept: FAMILY MEDICINE CLINIC | Facility: CLINIC | Age: 58
End: 2023-03-28
Payer: COMMERCIAL

## 2023-03-28 VITALS
OXYGEN SATURATION: 98 % | TEMPERATURE: 97.7 F | DIASTOLIC BLOOD PRESSURE: 88 MMHG | BODY MASS INDEX: 37.45 KG/M2 | SYSTOLIC BLOOD PRESSURE: 146 MMHG | HEIGHT: 66 IN | HEART RATE: 90 BPM | WEIGHT: 233 LBS | RESPIRATION RATE: 15 BRPM

## 2023-03-28 DIAGNOSIS — S30.861A TICK BITE OF ABDOMINAL WALL, INITIAL ENCOUNTER: Primary | ICD-10-CM
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
## 2023-03-28 RX ORDER — DOXYCYCLINE HYCLATE 100 MG/1
100 CAPSULE ORAL 2 TIMES DAILY
Qty: 20 CAPSULE | Refills: 0 | Status: SHIPPED | OUTPATIENT
Start: 2023-03-28 | End: 2023-04-07

## 2023-03-28 NOTE — PROGRESS NOTES
"Chief Complaint  Tick Removal (Tick bite on lower left side of abdomen )  Subjective        Connie Green presents to [facility]  History of Present Illness  Pt comes in today with c/o tick bite to left upper abd. Found it yesterday. Not sure how long it was attached. Had to use tweezers to get it off.  Denies any fever, chills, body aches, joint pain, etc.   Tick Removal         Objective     Vital Signs:   /88   Pulse 90   Temp 97.7 °F (36.5 °C)   Resp 15   Ht 167.6 cm (66\")   Wt 106 kg (233 lb)   SpO2 98%   BMI 37.61 kg/m²       BP Readings from Last 3 Encounters:   03/28/23 146/88   03/06/23 124/84   02/16/23 135/90       Wt Readings from Last 3 Encounters:   03/28/23 106 kg (233 lb)   03/06/23 102 kg (224 lb 11.2 oz)   02/16/23 105 kg (231 lb 1.6 oz)     Physical Exam  Constitutional:       Appearance: She is well-developed.   Eyes:      Pupils: Pupils are equal, round, and reactive to light.   Cardiovascular:      Rate and Rhythm: Normal rate and regular rhythm.   Pulmonary:      Effort: Pulmonary effort is normal.      Breath sounds: Normal breath sounds.   Skin:         Neurological:      Mental Status: She is alert and oriented to person, place, and time.        Result Review :                 Assessment and Plan    Diagnoses and all orders for this visit:    1. Tick bite of abdominal wall, initial encounter (Primary)  -     doxycycline (VIBRAMYCIN) 100 MG capsule; Take 1 capsule by mouth 2 (Two) Times a Day for 10 days.  Dispense: 20 capsule; Refill: 0    keep clean  Hydrocortisone otc  Doxy  During this office visit, we discussed the pertinent aspects of the visit and treatment recommendations. Pt verbalizes understanding. Follow up was discussed. Patient was given the opportunity to ask questions and discuss other concerns.         Follow Up   Return if symptoms worsen or fail to improve.  Patient was given instructions and counseling regarding her " condition or for health maintenance advice. Please see specific information pulled into the AVS if appropriate.

## 2023-08-28 ENCOUNTER — HOSPITAL ENCOUNTER (OUTPATIENT)
Dept: MRI IMAGING | Facility: HOSPITAL | Age: 58
Discharge: HOME | End: 2023-08-28
Admitting: SURGERY
Payer: COMMERCIAL

## 2023-08-28 DIAGNOSIS — D05.11 DUCTAL CARCINOMA IN SITU OF RIGHT BREAST: ICD-10-CM

## 2023-08-28 DIAGNOSIS — Z91.89 INCREASED RISK OF BREAST CANCER: ICD-10-CM

## 2023-08-28 PROCEDURE — 0 GADOBENATE DIMEGLUMINE 529 MG/ML SOLUTION: Performed by: SURGERY

## 2023-08-28 PROCEDURE — A9577 INJ MULTIHANCE: HCPCS | Performed by: SURGERY

## 2023-08-28 PROCEDURE — 77049 MRI BREAST C-+ W/CAD BI: CPT

## 2023-08-28 RX ADMIN — GADOBENATE DIMEGLUMINE 20 ML: 529 INJECTION, SOLUTION INTRAVENOUS at 10:49

## 2023-08-30 ENCOUNTER — TELEPHONE (OUTPATIENT)
Dept: MAMMOGRAPHY | Facility: CLINIC | Age: 58
End: 2023-08-30
Payer: COMMERCIAL

## 2023-08-30 NOTE — TELEPHONE ENCOUNTER
I told her that the recent MRI did not show any suspicious enhancement in either breast.  She is scheduled for another office visit in March 2024.  Her genetic testing was also negative.

## 2023-10-23 ENCOUNTER — LAB (OUTPATIENT)
Dept: FAMILY MEDICINE CLINIC | Facility: CLINIC | Age: 58
End: 2023-10-23
Payer: COMMERCIAL

## 2023-10-23 ENCOUNTER — OFFICE VISIT (OUTPATIENT)
Dept: FAMILY MEDICINE CLINIC | Facility: CLINIC | Age: 58
End: 2023-10-23
Payer: COMMERCIAL

## 2023-10-23 VITALS
WEIGHT: 226 LBS | OXYGEN SATURATION: 100 % | HEIGHT: 66 IN | RESPIRATION RATE: 18 BRPM | SYSTOLIC BLOOD PRESSURE: 124 MMHG | BODY MASS INDEX: 36.32 KG/M2 | HEART RATE: 78 BPM | DIASTOLIC BLOOD PRESSURE: 80 MMHG

## 2023-10-23 DIAGNOSIS — Z23 ENCOUNTER FOR ADMINISTRATION OF VACCINE: ICD-10-CM

## 2023-10-23 DIAGNOSIS — E55.9 VITAMIN D DEFICIENCY: ICD-10-CM

## 2023-10-23 DIAGNOSIS — Z00.00 ANNUAL PHYSICAL EXAM: ICD-10-CM

## 2023-10-23 DIAGNOSIS — Z00.00 ANNUAL PHYSICAL EXAM: Primary | ICD-10-CM

## 2023-10-23 DIAGNOSIS — Z83.3 FAMILY HISTORY OF DIABETES MELLITUS IN MOTHER: ICD-10-CM

## 2023-10-23 LAB
25(OH)D3 SERPL-MCNC: 57 NG/ML (ref 30–100)
ALBUMIN SERPL-MCNC: 4.3 G/DL (ref 3.5–5.2)
ALBUMIN/GLOB SERPL: 1.4 G/DL
ALP SERPL-CCNC: 54 U/L (ref 39–117)
ALT SERPL W P-5'-P-CCNC: 14 U/L (ref 1–33)
ANION GAP SERPL CALCULATED.3IONS-SCNC: 9 MMOL/L (ref 5–15)
AST SERPL-CCNC: 17 U/L (ref 1–32)
BILIRUB SERPL-MCNC: 0.5 MG/DL (ref 0–1.2)
BILIRUB UR QL STRIP: NEGATIVE
BUN SERPL-MCNC: 14 MG/DL (ref 6–20)
BUN/CREAT SERPL: 15.6 (ref 7–25)
CALCIUM SPEC-SCNC: 9.9 MG/DL (ref 8.6–10.5)
CHLORIDE SERPL-SCNC: 105 MMOL/L (ref 98–107)
CHOLEST SERPL-MCNC: 187 MG/DL (ref 0–200)
CLARITY UR: CLEAR
CO2 SERPL-SCNC: 29 MMOL/L (ref 22–29)
COLOR UR: YELLOW
CREAT SERPL-MCNC: 0.9 MG/DL (ref 0.57–1)
DEPRECATED RDW RBC AUTO: 41 FL (ref 37–54)
EGFRCR SERPLBLD CKD-EPI 2021: 73.8 ML/MIN/1.73
ERYTHROCYTE [DISTWIDTH] IN BLOOD BY AUTOMATED COUNT: 12.7 % (ref 12.3–15.4)
GLOBULIN UR ELPH-MCNC: 3.1 GM/DL
GLUCOSE SERPL-MCNC: 103 MG/DL (ref 65–99)
GLUCOSE UR STRIP-MCNC: NEGATIVE MG/DL
HBA1C MFR BLD: 5.6 % (ref 4.8–5.6)
HCT VFR BLD AUTO: 41 % (ref 34–46.6)
HDLC SERPL-MCNC: 62 MG/DL (ref 40–60)
HGB BLD-MCNC: 13.6 G/DL (ref 12–15.9)
HGB UR QL STRIP.AUTO: NEGATIVE
HOLD SPECIMEN: NORMAL
KETONES UR QL STRIP: NEGATIVE
LDLC SERPL CALC-MCNC: 112 MG/DL (ref 0–100)
LDLC/HDLC SERPL: 1.79 {RATIO}
LEUKOCYTE ESTERASE UR QL STRIP.AUTO: NEGATIVE
MCH RBC QN AUTO: 29.7 PG (ref 26.6–33)
MCHC RBC AUTO-ENTMCNC: 33.2 G/DL (ref 31.5–35.7)
MCV RBC AUTO: 89.5 FL (ref 79–97)
NITRITE UR QL STRIP: NEGATIVE
PH UR STRIP.AUTO: 8.5 [PH] (ref 5–8)
PLATELET # BLD AUTO: 253 10*3/MM3 (ref 140–450)
PMV BLD AUTO: 10.4 FL (ref 6–12)
POTASSIUM SERPL-SCNC: 4 MMOL/L (ref 3.5–5.2)
PROT SERPL-MCNC: 7.4 G/DL (ref 6–8.5)
PROT UR QL STRIP: NEGATIVE
RBC # BLD AUTO: 4.58 10*6/MM3 (ref 3.77–5.28)
SODIUM SERPL-SCNC: 143 MMOL/L (ref 136–145)
SP GR UR STRIP: 1.01 (ref 1–1.03)
TRIGL SERPL-MCNC: 70 MG/DL (ref 0–150)
UROBILINOGEN UR QL STRIP: ABNORMAL
VLDLC SERPL-MCNC: 13 MG/DL (ref 5–40)
WBC NRBC COR # BLD: 5.4 10*3/MM3 (ref 3.4–10.8)

## 2023-10-23 PROCEDURE — 80061 LIPID PANEL: CPT | Performed by: NURSE PRACTITIONER

## 2023-10-23 PROCEDURE — 85027 COMPLETE CBC AUTOMATED: CPT | Performed by: NURSE PRACTITIONER

## 2023-10-23 PROCEDURE — 80053 COMPREHEN METABOLIC PANEL: CPT | Performed by: NURSE PRACTITIONER

## 2023-10-23 PROCEDURE — 36415 COLL VENOUS BLD VENIPUNCTURE: CPT

## 2023-10-23 PROCEDURE — 81003 URINALYSIS AUTO W/O SCOPE: CPT | Performed by: NURSE PRACTITIONER

## 2023-10-23 PROCEDURE — 82306 VITAMIN D 25 HYDROXY: CPT | Performed by: NURSE PRACTITIONER

## 2023-10-23 PROCEDURE — 83036 HEMOGLOBIN GLYCOSYLATED A1C: CPT | Performed by: NURSE PRACTITIONER

## 2023-10-23 NOTE — PROGRESS NOTES
"Chief Complaint  Annual Exam    Subjective          Connie Green presents to [facility]  History of Present Illness    Is her today for annual physical    h/o breast ca (dcis), protein c def., vitamin d def., dermatofibroma, insomnia, prediabetes  Her current medicines are asa, vit d, cinnamon, DHA, krill oil, mvi    FH DM/mother    Breast cancer screening 3/6/23 by MRI    Colon cancer screening, meliza, completed 11/16/22    She is exercising regularly, working on bringing her blood sugars/A1C down  She endorses that her diet is fairly healthy, has cut carbs, changed the way she is eating    Review of Systems   Constitutional: Negative.  Negative for appetite change, fatigue and fever.   Respiratory: Negative.  Negative for shortness of breath.    Cardiovascular:  Negative for chest pain and palpitations.   Gastrointestinal: Negative.  Negative for constipation and diarrhea.   Genitourinary: Negative.  Negative for dysuria, frequency and urgency.   Musculoskeletal: Negative.  Negative for arthralgias and myalgias.   Allergic/Immunologic: Positive for environmental allergies.   Neurological: Negative.  Negative for dizziness, weakness and headaches.   Psychiatric/Behavioral: Negative.  Negative for dysphoric mood and sleep disturbance. The patient is not nervous/anxious.      Objective   Vital Signs:  /80 (BP Location: Left arm)   Pulse 78   Resp 18   Ht 167.6 cm (65.98\")   Wt 103 kg (226 lb)   SpO2 100%   BMI 36.50 kg/m²     BP Readings from Last 3 Encounters:   10/23/23 124/80   03/28/23 146/88   03/06/23 124/84        Wt Readings from Last 3 Encounters:   10/23/23 103 kg (226 lb)   03/28/23 106 kg (233 lb)   03/06/23 102 kg (224 lb 11.2 oz)            Physical Exam  Vitals reviewed.   Constitutional:       Appearance: Normal appearance.   HENT:      Right Ear: Tympanic membrane, ear canal and external ear normal.      Left Ear: Tympanic membrane, ear canal and " external ear normal.      Nose: Nose normal.      Mouth/Throat:      Mouth: Mucous membranes are moist.      Pharynx: Oropharynx is clear.   Eyes:      Extraocular Movements: Extraocular movements intact.   Neck:      Thyroid: No thyromegaly.      Vascular: No carotid bruit.   Cardiovascular:      Rate and Rhythm: Normal rate and regular rhythm.      Pulses: Normal pulses.      Heart sounds: Normal heart sounds.   Pulmonary:      Effort: Pulmonary effort is normal.      Breath sounds: Normal breath sounds.   Abdominal:      General: Bowel sounds are normal.      Palpations: Abdomen is soft.      Tenderness: There is no abdominal tenderness. There is no right CVA tenderness or left CVA tenderness.   Musculoskeletal:         General: Normal range of motion.      Cervical back: Neck supple. No tenderness.      Right lower leg: No edema.      Left lower leg: No edema.   Lymphadenopathy:      Cervical: No cervical adenopathy.   Skin:     General: Skin is warm.   Neurological:      Mental Status: She is alert and oriented to person, place, and time.   Psychiatric:         Mood and Affect: Mood normal.        Result Review :     2/16/23:    25 Hydroxy, Vitamin D  30.0 - 100.0 ng/ml 57.4     CMP          2/16/2023    14:55   CMP   Glucose 123    BUN 20    Creatinine 0.95    EGFR 69.6    Sodium 140    Potassium 3.9    Chloride 103    Calcium 9.5    Total Protein 7.8    Albumin 4.5    Globulin 3.3    Total Bilirubin 0.3    Alkaline Phosphatase 59    AST (SGOT) 20    ALT (SGPT) 16    Albumin/Globulin Ratio 1.4    BUN/Creatinine Ratio 21.1    Anion Gap 10.9      CBC          2/16/2023    14:55   CBC   WBC 6.61    RBC 4.61    Hemoglobin 13.1    Hematocrit 41.1    MCV 89.2    MCH 28.4    MCHC 31.9    RDW 13.2    Platelets 248      A1C Last 3 Results          2/16/2023    16:15   HGBA1C Last 3 Results   Hemoglobin A1C 5.90                Assessment and Plan    Diagnoses and all orders for this visit:    1. Annual physical exam  (Primary)  -     Comprehensive Metabolic Panel; Future  -     CBC (No Diff); Future  -     Urinalysis With Culture If Indicated - Urine, Clean Catch; Future  -     Lipid Panel; Future    2. Vitamin D deficiency  -     Vitamin D,25-Hydroxy; Future    3. Family history of diabetes mellitus in mother  -     Hemoglobin A1c; Future    4. Encounter for administration of vaccine  -     Fluzone >6 Months ([id number])           Follow Up   Return in about 1 year (around 10/23/2024), or as needed, for Annual physical.  Patient was given instructions and counseling regarding her condition or for health maintenance advice. Please see specific information pulled into the AVS if appropriate.   Body mass index is 36.5 kg/m².

## 2024-03-04 ENCOUNTER — OFFICE VISIT (OUTPATIENT)
Dept: MAMMOGRAPHY | Facility: CLINIC | Age: 59
End: 2024-03-04
Payer: COMMERCIAL

## 2024-03-04 VITALS
SYSTOLIC BLOOD PRESSURE: 164 MMHG | HEIGHT: 66 IN | BODY MASS INDEX: 36.16 KG/M2 | DIASTOLIC BLOOD PRESSURE: 102 MMHG | HEART RATE: 79 BPM | WEIGHT: 225 LBS | OXYGEN SATURATION: 97 %

## 2024-03-04 DIAGNOSIS — D05.11 DUCTAL CARCINOMA IN SITU OF RIGHT BREAST: Primary | ICD-10-CM

## 2024-03-04 DIAGNOSIS — Z91.89 AT HIGH RISK FOR BREAST CANCER: ICD-10-CM

## 2024-03-04 PROCEDURE — 99213 OFFICE O/P EST LOW 20 MIN: CPT | Performed by: SURGERY

## 2024-03-04 NOTE — PROGRESS NOTES
Subjective   Connie Green is a 58 y.o. female     History of Present Illness   She is a nice 58-year-old white female who was treated for DCIS of the right breast in 2017.  At the time the patient was not able to take tamoxifen because of a history of blood clots.  It was opted not to give her ovarian suppression and an AI.  She followed with Dr. Anderson for 5 years and has since been released.    The patient's imaging is in good order.  She just had [facility] but unfortunately the images and report are not available at this point in time.  An MRI was also performed in August 2023.  Those reports have been reviewed as well.  She did not have any suspicious masslike or non-mass-like enhancement in either breast.  There were some postoperative changes in the right breast.    The patient has not felt anything of concern but has been suffering a lot from hot flashes.  It has been suggested that she take Veozah which is a nonhormonal treatment for vasomotor symptoms.      Review of Systems constitutional-no unusual changes in weight or appetite.  Past Medical History:   Diagnosis Date    Breast cancer 01/2017    Right breast    Cancer     right breast cancer    Prothrombin gene mutation      Past Surgical History:   Procedure Laterality Date    BREAST BIOPSY Right 01/2017    Malignant    BREAST LUMPECTOMY Right 3/1/2017    Procedure: RIGHT BREAST LUMPECTOMY WITH NEEDLE LOCALIZATION;  Surgeon: Neal Sanchez MD;  Location: [facility]  Service:     ENDOMETRIAL ABLATION  2007    LAPAROSCOPIC TUBAL LIGATION      TUBAL ABDOMINAL LIGATION       Family History   Problem Relation Age of Onset    Clotting disorder Father     Heart failure Father     Heart attack Father     Heart disease Father     Alcohol abuse Brother         2    Prostate cancer Brother 51    Hypertension Brother     Heart attack Paternal Grandmother     Heart failure Paternal Grandmother     Heart disease Paternal  Grandmother     Hypertension Paternal Grandmother     Diabetes Maternal Uncle     Heart disease Maternal Grandmother     Hypertension Maternal Grandmother     Heart disease Maternal Grandfather     Diabetes Maternal Grandfather     Heart disease Paternal Grandfather     Skin cancer Brother 58        Skin/neck cancer    Hypertension Brother     Thrombophilia Other     Alcohol abuse Son     Anxiety disorder Mother     Diabetes Mother     Anxiety disorder Sister      Social History     Socioeconomic History    Marital status:      Spouse name: Yelitza    Number of children: 3    Years of education: High school   Tobacco Use    Smoking status: Never     Passive exposure: Never    Smokeless tobacco: Never   Vaping Use    Vaping status: Never Used   Substance and Sexual Activity    Alcohol use: No    Drug use: No    Sexual activity: Yes     Partners: Male     Comment:        Objective   Physical Exam  Constitutional-BMI 36.3, no acute distress  Right breast-medium size and symmetrical.  There are scattered capillary hemangiomas and she has a scar in the upper inner quadrant that is well-healed.  There is no skin dimpling or nipple retraction when the arms are raised and the pectoralis muscle is contracted.  I do not detect any nipple discharge either.  There is a deficit of breast tissue beneath the lumpectomy scar but otherwise there are no worrisome masses palpable.  Left breast-medium size and symmetrical.  She has scattered capillary hemangiomas but no scars or changes to the skin of the breast.  I could not express any nipple discharge.  There was no skin dimpling or nipple retraction when the arms were raised and the pectoralis muscle was contracted.  I did not palpate any worrisome masses within the breast.  Lymphatics-no cervical or axillary adenopathy.      Assessment & Plan   Personal history of right breast cancer-the patient's imaging looks to be in good order.  I will follow-up on the mammograms  that she recently had.  If there are no abnormalities, we will obtain an MRI in September 2024.  I would like to see her in the office a couple weeks after that MRI.  At that point my office visits will be  from those with her gynecologist by 6 months.  I think it is fine for her to take Veozah for her vasomotor symptoms.  I did encourage her to follow all of the recommended suggestions in terms of blood work.    The encounter diagnosis was Ductal carcinoma in situ of right breast.

## 2024-03-04 NOTE — LETTER
March 4, 2024     SANDRA Begum  [address]  [address]  [facility] [address]    Patient: Connie Green   YOB: 1965   Date of Visit: 3/4/2024     Dear SANDRA Begum:       Thank you for referring Connie Green to me for evaluation. Below are the relevant portions of my assessment and plan of care.    If you have questions, please do not hesitate to call me. I look forward to following Connie along with you.         Sincerely,        Neal Sanchez MD        CC: Isai Johnson, Neal Gonzalez MD  03/04/24 1510  Sign when Signing Visit  Subjective  Connie Green is a 58 y.o. female     History of Present Illness   She is a nice 58-year-old white female who was treated for DCIS of the right breast in 2017.  At the time the patient was not able to take tamoxifen because of a history of blood clots.  It was opted not to give her ovarian suppression and an AI.  She followed with Dr. Anderson for 5 years and has since been released.    The patient's imaging is in good order.  She just had mammograms performed at [facility] but unfortunately the images and report are not available at this point in time.  An MRI was also performed in August 2023.  Those reports have been reviewed as well.  She did not have any suspicious masslike or non-mass-like enhancement in either breast.  There were some postoperative changes in the right breast.    The patient has not felt anything of concern but has been suffering a lot from hot flashes.  It has been suggested that she take Veozah which is a nonhormonal treatment for vasomotor symptoms.      Review of Systems constitutional-no unusual changes in weight or appetite.  Past Medical History:   Diagnosis Date   • Breast cancer 01/2017    Right breast   • Cancer     right breast cancer   • Prothrombin gene mutation      Past Surgical History:   Procedure Laterality Date   • BREAST BIOPSY Right 01/2017    Malignant   • BREAST LUMPECTOMY  Right 3/1/2017    Procedure: RIGHT BREAST LUMPECTOMY WITH NEEDLE LOCALIZATION;  Surgeon: Neal Sanchez MD;  Location: [facility] OR;  Service:    • ENDOMETRIAL ABLATION  2007   • LAPAROSCOPIC TUBAL LIGATION     • TUBAL ABDOMINAL LIGATION       Family History   Problem Relation Age of Onset   • Clotting disorder Father    • Heart failure Father    • Heart attack Father    • Heart disease Father    • Alcohol abuse Brother         2   • Prostate cancer Brother 51   • Hypertension Brother    • Heart attack Paternal Grandmother    • Heart failure Paternal Grandmother    • Heart disease Paternal Grandmother    • Hypertension Paternal Grandmother    • Diabetes Maternal Uncle    • Heart disease Maternal Grandmother    • Hypertension Maternal Grandmother    • Heart disease Maternal Grandfather    • Diabetes Maternal Grandfather    • Heart disease Paternal Grandfather    • Skin cancer Brother 58        Skin/neck cancer   • Hypertension Brother    • Thrombophilia Other    • Alcohol abuse Son    • Anxiety disorder Mother    • Diabetes Mother    • Anxiety disorder Sister      Social History     Socioeconomic History   • Marital status:      Spouse name: Yelitza   • Number of children: 3   • Years of education: High school   Tobacco Use   • Smoking status: Never     Passive exposure: Never   • Smokeless tobacco: Never   Vaping Use   • Vaping status: Never Used   Substance and Sexual Activity   • Alcohol use: No   • Drug use: No   • Sexual activity: Yes     Partners: Male     Comment:        Objective  Physical Exam  Constitutional-BMI 36.3, no acute distress  Right breast-medium size and symmetrical.  There are scattered capillary hemangiomas and she has a scar in the upper inner quadrant that is well-healed.  There is no skin dimpling or nipple retraction when the arms are raised and the pectoralis muscle is contracted.  I do not detect any nipple discharge either.  There is a deficit of breast tissue beneath  the lumpectomy scar but otherwise there are no worrisome masses palpable.  Left breast-medium size and symmetrical.  She has scattered capillary hemangiomas but no scars or changes to the skin of the breast.  I could not express any nipple discharge.  There was no skin dimpling or nipple retraction when the arms were raised and the pectoralis muscle was contracted.  I did not palpate any worrisome masses within the breast.  Lymphatics-no cervical or axillary adenopathy.      Assessment & Plan  Personal history of right breast cancer-the patient's imaging looks to be in good order.  I will follow-up on the mammograms that she recently had.  If there are no abnormalities, we will obtain an MRI in September 2024.  I would like to see her in the office a couple weeks after that MRI.  At that point my office visits will be  from those with her gynecologist by 6 months.  I think it is fine for her to take Veozah for her vasomotor symptoms.  I did encourage her to follow all of the recommended suggestions in terms of blood work.    The encounter diagnosis was Ductal carcinoma in situ of right breast.

## 2024-03-08 ENCOUNTER — PATIENT ROUNDING (BHMG ONLY) (OUTPATIENT)
Dept: MAMMOGRAPHY | Facility: CLINIC | Age: 59
End: 2024-03-08
Payer: COMMERCIAL

## 2024-03-08 NOTE — PROGRESS NOTES
March 8, 2024    Hello, may I speak with Connie Green?    My name is Parish      I am  with [facility]  [address] [address] [phone].    Before we get started may I verify your date of birth? 1965    I am calling to officially welcome you to our practice and ask about your recent visit. Is this a good time to talk? yes    Tell me about your visit with us. What things went well?  Speaking with Dr. Sanchez was very pleasant.       We're always looking for ways to make our patients' experiences even better. Do you have recommendations on ways we may improve?  no    Overall were you satisfied with your first visit to our practice? yes       I appreciate you taking the time to speak with me today. Is there anything else I can do for you? no      Thank you, and have a great day.

## 2024-09-09 ENCOUNTER — DOCUMENTATION (OUTPATIENT)
Dept: MAMMOGRAPHY | Facility: CLINIC | Age: 59
End: 2024-09-09
Payer: COMMERCIAL

## 2024-09-09 ENCOUNTER — HOSPITAL ENCOUNTER (OUTPATIENT)
Dept: MRI IMAGING | Facility: HOSPITAL | Age: 59
Discharge: HOME | End: 2024-09-09
Admitting: SURGERY
Payer: COMMERCIAL

## 2024-09-09 DIAGNOSIS — Z91.89 AT HIGH RISK FOR BREAST CANCER: ICD-10-CM

## 2024-09-09 DIAGNOSIS — D05.11 DUCTAL CARCINOMA IN SITU OF RIGHT BREAST: ICD-10-CM

## 2024-09-09 PROCEDURE — 77049 MRI BREAST C-+ W/CAD BI: CPT

## 2024-09-09 PROCEDURE — 0 GADOBENATE DIMEGLUMINE 529 MG/ML SOLUTION: Performed by: SURGERY

## 2024-09-09 PROCEDURE — A9577 INJ MULTIHANCE: HCPCS | Performed by: SURGERY

## 2024-09-09 RX ADMIN — GADOBENATE DIMEGLUMINE 20 ML: 529 INJECTION, SOLUTION INTRAVENOUS at 13:26

## 2024-09-09 NOTE — PROGRESS NOTES
I spoke with her tonight.  I told her the recent MRI did not show any suspicious findings in either breast and the lymph nodes were fine.

## 2024-09-23 ENCOUNTER — OFFICE VISIT (OUTPATIENT)
Dept: MAMMOGRAPHY | Facility: CLINIC | Age: 59
End: 2024-09-23
Payer: COMMERCIAL

## 2024-09-23 VITALS
DIASTOLIC BLOOD PRESSURE: 98 MMHG | OXYGEN SATURATION: 98 % | HEART RATE: 78 BPM | HEIGHT: 66 IN | BODY MASS INDEX: 36.48 KG/M2 | SYSTOLIC BLOOD PRESSURE: 161 MMHG | WEIGHT: 227 LBS

## 2024-09-23 DIAGNOSIS — D05.11 DUCTAL CARCINOMA IN SITU OF RIGHT BREAST: Primary | ICD-10-CM

## 2024-09-23 DIAGNOSIS — R92.30 DENSE BREAST TISSUE ON MAMMOGRAM, UNSPECIFIED TYPE: ICD-10-CM

## 2024-09-23 PROCEDURE — 99213 OFFICE O/P EST LOW 20 MIN: CPT | Performed by: SURGERY

## 2024-10-30 ENCOUNTER — LAB (OUTPATIENT)
Dept: FAMILY MEDICINE CLINIC | Facility: CLINIC | Age: 59
End: 2024-10-30
Payer: COMMERCIAL

## 2024-10-30 ENCOUNTER — OFFICE VISIT (OUTPATIENT)
Dept: FAMILY MEDICINE CLINIC | Facility: CLINIC | Age: 59
End: 2024-10-30
Payer: COMMERCIAL

## 2024-10-30 VITALS
HEIGHT: 66 IN | BODY MASS INDEX: 36.16 KG/M2 | SYSTOLIC BLOOD PRESSURE: 122 MMHG | DIASTOLIC BLOOD PRESSURE: 88 MMHG | HEART RATE: 47 BPM | WEIGHT: 225 LBS | OXYGEN SATURATION: 100 %

## 2024-10-30 DIAGNOSIS — Z23 ENCOUNTER FOR ADMINISTRATION OF VACCINE: ICD-10-CM

## 2024-10-30 DIAGNOSIS — Z00.00 ANNUAL PHYSICAL EXAM: ICD-10-CM

## 2024-10-30 DIAGNOSIS — Z00.00 ANNUAL PHYSICAL EXAM: Primary | ICD-10-CM

## 2024-10-30 PROBLEM — K44.9 HIATAL HERNIA: Status: ACTIVE | Noted: 2024-10-30

## 2024-10-30 LAB
25(OH)D3 SERPL-MCNC: 47.5 NG/ML (ref 30–100)
ALBUMIN SERPL-MCNC: 4.4 G/DL (ref 3.5–5.2)
ALBUMIN/GLOB SERPL: 1.5 G/DL
ALP SERPL-CCNC: 54 U/L (ref 39–117)
ALT SERPL W P-5'-P-CCNC: 16 U/L (ref 1–33)
ANION GAP SERPL CALCULATED.3IONS-SCNC: 9.8 MMOL/L (ref 5–15)
AST SERPL-CCNC: 19 U/L (ref 1–32)
BILIRUB SERPL-MCNC: 0.6 MG/DL (ref 0–1.2)
BUN SERPL-MCNC: 16 MG/DL (ref 8–23)
BUN/CREAT SERPL: 17.8 (ref 7–25)
CALCIUM SPEC-SCNC: 9.4 MG/DL (ref 8.6–10.5)
CHLORIDE SERPL-SCNC: 99 MMOL/L (ref 98–107)
CHOLEST SERPL-MCNC: 188 MG/DL (ref 0–200)
CO2 SERPL-SCNC: 27.2 MMOL/L (ref 22–29)
CREAT SERPL-MCNC: 0.9 MG/DL (ref 0.57–1)
DEPRECATED RDW RBC AUTO: 41.6 FL (ref 37–54)
EGFRCR SERPLBLD CKD-EPI 2021: 73.3 ML/MIN/1.73
ERYTHROCYTE [DISTWIDTH] IN BLOOD BY AUTOMATED COUNT: 12.6 % (ref 12.3–15.4)
GLOBULIN UR ELPH-MCNC: 3 GM/DL
GLUCOSE SERPL-MCNC: 90 MG/DL (ref 65–99)
HBA1C MFR BLD: 5.9 % (ref 4.8–5.6)
HCT VFR BLD AUTO: 41.8 % (ref 34–46.6)
HDLC SERPL-MCNC: 56 MG/DL (ref 40–60)
HGB BLD-MCNC: 14 G/DL (ref 12–15.9)
LDLC SERPL CALC-MCNC: 120 MG/DL (ref 0–100)
LDLC/HDLC SERPL: 2.14 {RATIO}
MCH RBC QN AUTO: 30.4 PG (ref 26.6–33)
MCHC RBC AUTO-ENTMCNC: 33.5 G/DL (ref 31.5–35.7)
MCV RBC AUTO: 90.9 FL (ref 79–97)
PLATELET # BLD AUTO: 252 10*3/MM3 (ref 140–450)
PMV BLD AUTO: 10.7 FL (ref 6–12)
POTASSIUM SERPL-SCNC: 4 MMOL/L (ref 3.5–5.2)
PROT SERPL-MCNC: 7.4 G/DL (ref 6–8.5)
RBC # BLD AUTO: 4.6 10*6/MM3 (ref 3.77–5.28)
SODIUM SERPL-SCNC: 136 MMOL/L (ref 136–145)
TRIGL SERPL-MCNC: 62 MG/DL (ref 0–150)
TSH SERPL DL<=0.05 MIU/L-ACNC: 3.96 UIU/ML (ref 0.27–4.2)
VLDLC SERPL-MCNC: 12 MG/DL (ref 5–40)
WBC NRBC COR # BLD AUTO: 4.99 10*3/MM3 (ref 3.4–10.8)

## 2024-10-30 PROCEDURE — 83036 HEMOGLOBIN GLYCOSYLATED A1C: CPT | Performed by: NURSE PRACTITIONER

## 2024-10-30 PROCEDURE — 80050 GENERAL HEALTH PANEL: CPT | Performed by: NURSE PRACTITIONER

## 2024-10-30 PROCEDURE — 80061 LIPID PANEL: CPT | Performed by: NURSE PRACTITIONER

## 2024-10-30 PROCEDURE — 90471 IMMUNIZATION ADMIN: CPT | Performed by: NURSE PRACTITIONER

## 2024-10-30 PROCEDURE — 36415 COLL VENOUS BLD VENIPUNCTURE: CPT

## 2024-10-30 PROCEDURE — 90656 IIV3 VACC NO PRSV 0.5 ML IM: CPT | Performed by: NURSE PRACTITIONER

## 2024-10-30 PROCEDURE — 99396 PREV VISIT EST AGE 40-64: CPT | Performed by: NURSE PRACTITIONER

## 2024-10-30 PROCEDURE — 82306 VITAMIN D 25 HYDROXY: CPT | Performed by: NURSE PRACTITIONER

## 2024-10-30 NOTE — PROGRESS NOTES
Chief Complaint  Annual Exam    Subjective          Connie Green presents to [facility]  History of Present Illness    Is here today for annual physical  She has been monitoring her BP since the end of September, she endorses that it is elevated at times, she also endorses some anxiety  She is caring for her mother who lives at her own home, she is also working full time    She has h/o vit d def., impaired glucose tolerance, DCIS right breast, dermatofibroma, insomnia, protein C def.    Her current medicines are calcium-magnesium, chlorophyllin copper complex sod powder, vit d3, elderberry, mvi, omega 3s, valerian root    Diet is reported to be healthy    Exercises regularly, has been trying to walk/exercise after meals    Mammogram/MRI is UTD  DEXA - she thinks this has been done already  Colon cancer screening per meliza, last 11/16/22 was negative    She tells me that she has a hiatal hernia found incidentally on her recent MRI    Review of Systems   Constitutional: Negative.  Negative for activity change, appetite change and fatigue.   Respiratory: Negative.  Negative for shortness of breath.    Cardiovascular: Negative.  Negative for chest pain and palpitations.   Gastrointestinal:  Negative for abdominal pain, constipation and diarrhea.        Endorses that she has some heartburn, not frequent   Genitourinary: Negative.    Musculoskeletal:  Positive for arthralgias. Negative for gait problem and myalgias.        Has been having some intermittent hip pain, has not been bothering her recently   Neurological: Negative.  Negative for dizziness, weakness and headaches.   Psychiatric/Behavioral:  Positive for sleep disturbance. Negative for dysphoric mood. The patient is nervous/anxious.         Endorses that she does not sleep well, she wakes up through the night however she is able to fall back to sleep easily     Objective   Vital Signs:  /88   Pulse (!) 47   Ht  "167.6 cm (66\")   Wt 102 kg (225 lb)   SpO2 100%   BMI 36.32 kg/m²     BP Readings from Last 3 Encounters:   10/30/24 122/88   09/23/24 161/98   03/04/24 (!) 164/102        Wt Readings from Last 3 Encounters:   10/30/24 102 kg (225 lb)   09/23/24 103 kg (227 lb)   03/04/24 102 kg (225 lb)        Class 2 Severe Obesity (BMI >=35 and <=39.9). Obesity-related health conditions include the following: none. Obesity is unchanged. BMI is is above average; BMI management plan is completed. We discussed portion control and increasing exercise.      Physical Exam  Vitals reviewed.   Constitutional:       Appearance: Normal appearance. She is obese.   HENT:      Right Ear: Tympanic membrane, ear canal and external ear normal.      Left Ear: Tympanic membrane, ear canal and external ear normal.      Nose: Nose normal.      Mouth/Throat:      Mouth: Mucous membranes are moist.      Pharynx: Oropharynx is clear.   Neck:      Thyroid: No thyromegaly.      Vascular: No carotid bruit.   Cardiovascular:      Rate and Rhythm: Regular rhythm. Bradycardia present.      Heart sounds: Normal heart sounds.   Pulmonary:      Effort: Pulmonary effort is normal.      Breath sounds: Normal breath sounds.   Abdominal:      General: Bowel sounds are normal.      Palpations: Abdomen is soft.      Tenderness: There is no abdominal tenderness. There is no right CVA tenderness or left CVA tenderness.   Musculoskeletal:         General: Normal range of motion.      Cervical back: Neck supple. No tenderness.      Right lower leg: No edema.      Left lower leg: No edema.   Lymphadenopathy:      Cervical: No cervical adenopathy.   Skin:     General: Skin is warm.   Neurological:      Mental Status: She is alert and oriented to person, place, and time.   Psychiatric:         Mood and Affect: Mood normal.         Behavior: Behavior normal.        Result Review :                 Assessment and Plan    Diagnoses and all orders for this visit:    1. Annual " physical exam (Primary)  -     Comprehensive Metabolic Panel; Future  -     CBC (No Diff); Future  -     Lipid Panel; Future  -     TSH Rfx On Abnormal To Free T4; Future  -     Vitamin D,25-Hydroxy; Future  -     Hemoglobin A1c; Future    2. Encounter for administration of vaccine  -     Fluzone >6mos ([id number])           Follow Up   Return in about 1 year (around 10/30/2025), or sa needed, for Annual physical.  Patient was given instructions and counseling regarding her condition or for health maintenance advice. Please see specific information pulled into the AVS if appropriate.

## 2025-02-12 ENCOUNTER — TELEPHONE (OUTPATIENT)
Dept: MAMMOGRAPHY | Facility: CLINIC | Age: 60
End: 2025-02-12
Payer: COMMERCIAL

## 2025-02-21 ENCOUNTER — TELEPHONE (OUTPATIENT)
Dept: MAMMOGRAPHY | Facility: CLINIC | Age: 60
End: 2025-02-21

## 2025-02-21 NOTE — TELEPHONE ENCOUNTER
Hub staff attempted to follow warm transfer process and was unsuccessful     Caller: Connie Green    Relationship to patient: Self    Best call back number: [phone]      Patient is needing: PATIENT WAS RETURNING CALL TO [facility] TO RESCHEDULE HER 1YR F/U.

## (undated) DEVICE — INTENDED FOR TISSUE SEPARATION, AND OTHER PROCEDURES THAT REQUIRE A SHARP SURGICAL BLADE TO PUNCTURE OR CUT.: Brand: BARD-PARKER ® CARBON RIB-BACK BLADES

## (undated) DEVICE — ANTIBACTERIAL UNDYED BRAIDED (POLYGLACTIN 910), SYNTHETIC ABSORBABLE SUTURE: Brand: COATED VICRYL

## (undated) DEVICE — SPNG GZ WOVN 4X4IN 12PLY 10/BX STRL

## (undated) DEVICE — 3M™ STERI-STRIP™ REINFORCED ADHESIVE SKIN CLOSURES, R1547, 1/2 IN X 4 IN (12 MM X 100 MM), 6 STRIPS/ENVELOPE: Brand: 3M™ STERI-STRIP™

## (undated) DEVICE — SUT SILK 2/0 FS BLK 18IN 685G

## (undated) DEVICE — TUBING, SUCTION, 1/4" X 20', STRAIGHT: Brand: MEDLINE INDUSTRIES, INC.

## (undated) DEVICE — PENCL E/S HNDSWTCH SMOKEEVAC HOLSTR 10FT

## (undated) DEVICE — PK PROC MAJ 40

## (undated) DEVICE — DRSNG SURESITE WNDW 4X4.5

## (undated) DEVICE — SKIN PREP TRAY W/CHG: Brand: MEDLINE INDUSTRIES, INC.

## (undated) DEVICE — 3M™ STERI-STRIP™ COMPOUND BENZOIN TINCTURE 40 BAGS/CARTON 4 CARTONS/CASE C1544: Brand: 3M™ STERI-STRIP™

## (undated) DEVICE — ENCORE® LATEX ORTHO SIZE 8, STERILE LATEX POWDER-FREE SURGICAL GLOVE: Brand: ENCORE

## (undated) DEVICE — NDL HYPO ECLPS SFTY 22G 1 1/2IN

## (undated) DEVICE — ELECTRD BLD EDGE/INSUL1P 2.4X5.1MM STRL

## (undated) DEVICE — 3M™ STERI-STRIP™ ANTIMICROBIAL SKIN CLOSURES 1/2 INCH X 4 INCHES 50/CARTON 4 CARTONS/CASE A1847: Brand: 3M™ STERI-STRIP™